# Patient Record
Sex: MALE | Race: WHITE | NOT HISPANIC OR LATINO | Employment: FULL TIME | URBAN - NONMETROPOLITAN AREA
[De-identification: names, ages, dates, MRNs, and addresses within clinical notes are randomized per-mention and may not be internally consistent; named-entity substitution may affect disease eponyms.]

---

## 2017-12-20 ENCOUNTER — HOSPITAL ENCOUNTER (OUTPATIENT)
Facility: HOSPITAL | Age: 47
Setting detail: OBSERVATION
LOS: 1 days | Discharge: HOME | End: 2017-12-21
Attending: SURGERY | Admitting: SURGERY

## 2017-12-20 ENCOUNTER — OFFICE VISIT (OUTPATIENT)
Dept: SURGERY | Facility: CLINIC | Age: 47
End: 2017-12-20

## 2017-12-20 ENCOUNTER — APPOINTMENT (OUTPATIENT)
Dept: ULTRASOUND IMAGING | Facility: HOSPITAL | Age: 47
End: 2017-12-20

## 2017-12-20 VITALS
TEMPERATURE: 97.7 F | WEIGHT: 204 LBS | BODY MASS INDEX: 29.2 KG/M2 | HEIGHT: 70 IN | HEART RATE: 97 BPM | DIASTOLIC BLOOD PRESSURE: 93 MMHG | SYSTOLIC BLOOD PRESSURE: 125 MMHG

## 2017-12-20 DIAGNOSIS — N50.811 TESTICULAR PAIN, RIGHT: Primary | ICD-10-CM

## 2017-12-20 DIAGNOSIS — N45.2 ORCHITIS OF RIGHT TESTICLE: Primary | ICD-10-CM

## 2017-12-20 LAB
ALBUMIN SERPL-MCNC: 4 G/DL (ref 3.5–5)
ALBUMIN/GLOB SERPL: 1.7 G/DL (ref 1.5–2.5)
ALP SERPL-CCNC: 152 U/L (ref 40–129)
ALT SERPL W P-5'-P-CCNC: 41 U/L (ref 10–44)
ANION GAP SERPL CALCULATED.3IONS-SCNC: 6.1 MMOL/L (ref 3.6–11.2)
AST SERPL-CCNC: 19 U/L (ref 10–34)
BASOPHILS # BLD AUTO: 0.01 10*3/MM3 (ref 0–0.3)
BASOPHILS NFR BLD AUTO: 0.1 % (ref 0–2)
BILIRUB SERPL-MCNC: 0.8 MG/DL (ref 0.2–1.8)
BUN BLD-MCNC: 16 MG/DL (ref 7–21)
BUN/CREAT SERPL: 16.8 (ref 7–25)
CALCIUM SPEC-SCNC: 9 MG/DL (ref 7.7–10)
CHLORIDE SERPL-SCNC: 99 MMOL/L (ref 99–112)
CO2 SERPL-SCNC: 31.9 MMOL/L (ref 24.3–31.9)
CREAT BLD-MCNC: 0.95 MG/DL (ref 0.43–1.29)
DEPRECATED RDW RBC AUTO: 42.7 FL (ref 37–54)
EOSINOPHIL # BLD AUTO: 0 10*3/MM3 (ref 0–0.7)
EOSINOPHIL NFR BLD AUTO: 0 % (ref 0–5)
ERYTHROCYTE [DISTWIDTH] IN BLOOD BY AUTOMATED COUNT: 13.4 % (ref 11.5–14.5)
GFR SERPL CREATININE-BSD FRML MDRD: 85 ML/MIN/1.73
GLOBULIN UR ELPH-MCNC: 2.4 GM/DL
GLUCOSE BLD-MCNC: 107 MG/DL (ref 70–110)
HCT VFR BLD AUTO: 37.9 % (ref 42–52)
HGB BLD-MCNC: 12.3 G/DL (ref 14–18)
IMM GRANULOCYTES # BLD: 0.05 10*3/MM3 (ref 0–0.03)
IMM GRANULOCYTES NFR BLD: 0.3 % (ref 0–0.5)
LYMPHOCYTES # BLD AUTO: 0.91 10*3/MM3 (ref 1–3)
LYMPHOCYTES NFR BLD AUTO: 6.4 % (ref 21–51)
MCH RBC QN AUTO: 28.7 PG (ref 27–33)
MCHC RBC AUTO-ENTMCNC: 32.5 G/DL (ref 33–37)
MCV RBC AUTO: 88.3 FL (ref 80–94)
MONOCYTES # BLD AUTO: 0.8 10*3/MM3 (ref 0.1–0.9)
MONOCYTES NFR BLD AUTO: 5.6 % (ref 0–10)
NEUTROPHILS # BLD AUTO: 12.55 10*3/MM3 (ref 1.4–6.5)
NEUTROPHILS NFR BLD AUTO: 87.6 % (ref 30–70)
OSMOLALITY SERPL CALC.SUM OF ELEC: 275.5 MOSM/KG (ref 273–305)
PLATELET # BLD AUTO: 289 10*3/MM3 (ref 130–400)
PMV BLD AUTO: 10 FL (ref 6–10)
POTASSIUM BLD-SCNC: 3.6 MMOL/L (ref 3.5–5.3)
PROT SERPL-MCNC: 6.4 G/DL (ref 6–8)
RBC # BLD AUTO: 4.29 10*6/MM3 (ref 4.7–6.1)
SODIUM BLD-SCNC: 137 MMOL/L (ref 135–153)
WBC NRBC COR # BLD: 14.32 10*3/MM3 (ref 4.5–12.5)

## 2017-12-20 PROCEDURE — 25010000002 ENOXAPARIN PER 10 MG: Performed by: SURGERY

## 2017-12-20 PROCEDURE — 94799 UNLISTED PULMONARY SVC/PX: CPT

## 2017-12-20 PROCEDURE — 25010000002 MORPHINE PER 10 MG: Performed by: SURGERY

## 2017-12-20 PROCEDURE — 85025 COMPLETE CBC W/AUTO DIFF WBC: CPT | Performed by: SURGERY

## 2017-12-20 PROCEDURE — 76870 US EXAM SCROTUM: CPT | Performed by: RADIOLOGY

## 2017-12-20 PROCEDURE — 96372 THER/PROPH/DIAG INJ SC/IM: CPT

## 2017-12-20 PROCEDURE — 96374 THER/PROPH/DIAG INJ IV PUSH: CPT

## 2017-12-20 PROCEDURE — 99220 PR INITIAL OBSERVATION CARE/DAY 70 MINUTES: CPT | Performed by: SURGERY

## 2017-12-20 PROCEDURE — 76870 US EXAM SCROTUM: CPT

## 2017-12-20 PROCEDURE — 25010000002 LEVOFLOXACIN PER 250 MG: Performed by: SURGERY

## 2017-12-20 PROCEDURE — 80053 COMPREHEN METABOLIC PANEL: CPT | Performed by: SURGERY

## 2017-12-20 PROCEDURE — G0378 HOSPITAL OBSERVATION PER HR: HCPCS

## 2017-12-20 PROCEDURE — S0260 H&P FOR SURGERY: HCPCS | Performed by: SURGERY

## 2017-12-20 RX ORDER — ONDANSETRON 2 MG/ML
4 INJECTION INTRAMUSCULAR; INTRAVENOUS EVERY 6 HOURS PRN
Status: DISCONTINUED | OUTPATIENT
Start: 2017-12-20 | End: 2017-12-21 | Stop reason: HOSPADM

## 2017-12-20 RX ORDER — LEVOFLOXACIN 5 MG/ML
500 INJECTION, SOLUTION INTRAVENOUS EVERY 24 HOURS
Status: DISCONTINUED | OUTPATIENT
Start: 2017-12-20 | End: 2017-12-21 | Stop reason: HOSPADM

## 2017-12-20 RX ORDER — MORPHINE SULFATE 2 MG/ML
1 INJECTION, SOLUTION INTRAMUSCULAR; INTRAVENOUS EVERY 4 HOURS PRN
Status: DISCONTINUED | OUTPATIENT
Start: 2017-12-20 | End: 2017-12-21 | Stop reason: HOSPADM

## 2017-12-20 RX ORDER — ONDANSETRON 4 MG/1
4 TABLET, ORALLY DISINTEGRATING ORAL EVERY 6 HOURS PRN
Status: DISCONTINUED | OUTPATIENT
Start: 2017-12-20 | End: 2017-12-21 | Stop reason: HOSPADM

## 2017-12-20 RX ORDER — ONDANSETRON 4 MG/1
4 TABLET, FILM COATED ORAL EVERY 6 HOURS PRN
Status: DISCONTINUED | OUTPATIENT
Start: 2017-12-20 | End: 2017-12-21 | Stop reason: HOSPADM

## 2017-12-20 RX ORDER — SODIUM CHLORIDE 450 MG/100ML
50 INJECTION, SOLUTION INTRAVENOUS CONTINUOUS
Status: DISCONTINUED | OUTPATIENT
Start: 2017-12-20 | End: 2017-12-21 | Stop reason: HOSPADM

## 2017-12-20 RX ORDER — SODIUM CHLORIDE 0.9 % (FLUSH) 0.9 %
1-10 SYRINGE (ML) INJECTION AS NEEDED
Status: DISCONTINUED | OUTPATIENT
Start: 2017-12-20 | End: 2017-12-21 | Stop reason: HOSPADM

## 2017-12-20 RX ORDER — NALOXONE HCL 0.4 MG/ML
0.4 VIAL (ML) INJECTION
Status: DISCONTINUED | OUTPATIENT
Start: 2017-12-20 | End: 2017-12-21 | Stop reason: HOSPADM

## 2017-12-20 RX ADMIN — MORPHINE SULFATE 1 MG: 2 INJECTION, SOLUTION INTRAMUSCULAR; INTRAVENOUS at 20:32

## 2017-12-20 RX ADMIN — ENOXAPARIN SODIUM 40 MG: 40 INJECTION SUBCUTANEOUS at 17:47

## 2017-12-20 RX ADMIN — SODIUM CHLORIDE 50 ML/HR: 4.5 INJECTION, SOLUTION INTRAVENOUS at 17:47

## 2017-12-20 RX ADMIN — LEVOFLOXACIN 500 MG: 5 INJECTION, SOLUTION INTRAVENOUS at 17:48

## 2017-12-20 NOTE — PROGRESS NOTES
"12/20/2017    Patient Information  Brad Botello   [address]  [place] [address]  1970  [phone] (home)     Chief Complaint   Patient presents with   • Hernia     Dr. Smith Referral       HPI  Patient is a 47 or white male referred by Dr. Smith office.  He is complaining of severe right testicular pain.  He says is radiating up into his inguinal area on the right side.  He said this started a week ago but in the last 2 days is gotten much worse.  It is bothering him so much he can hardly move.    Review of Systems:  The Review of Systems has been reviewed and confirmed.    General: fever  Integumentary: negative  Eyes: negative  ENT: negative  Respiratory: negative  Gastrointestinal: nausea/vomiting, loss of appetite, heartburn and abdominal pain  Cardiovascular: negative  Neurological: negative  Psychiatric: negative  Hematologic/Lymphatic: negative  Genitourinary: negative  Musculoskeletal: painful joints, joint swelling and joint stiffness  Endocrine: negative  Breasts: negative      There is no problem list on file for this patient.        Past Medical History:   Diagnosis Date   • Arthritis          Past Surgical History:   Procedure Laterality Date   • KNEE SURGERY Bilateral          Family History   Problem Relation Age of Onset   • Hypertension Mother    • Heart disease Mother    • Diabetes Mother    • Cancer Mother          Social History   Substance Use Topics   • Smoking status: Current Every Day Smoker     Types: Electronic Cigarette   • Smokeless tobacco: Never Used      Comment: Vapor   • Alcohol use No       No current outpatient prescriptions on file.     No current facility-administered medications for this visit.          Allergies  Review of patient's allergies indicates no known allergies.    /93  Pulse 97  Temp 97.7 °F (36.5 °C)  Ht 177.8 cm (70\")  Wt 92.5 kg (204 lb)  BMI 29.27 kg/m2     Physical Exam   Constitutional: He is oriented to person, place, and time. He appears " well-developed and well-nourished. No distress.   HENT:   Head: Normocephalic.   Right Ear: External ear normal.   Left Ear: External ear normal.   Nose: Nose normal.   Mouth/Throat: Oropharynx is clear and moist.   Eyes: Conjunctivae and EOM are normal. Right eye exhibits no discharge. Left eye exhibits no discharge.   Neck: Normal range of motion. No JVD present. No tracheal deviation present. No thyromegaly present.   Cardiovascular: Normal rate, regular rhythm, normal heart sounds and intact distal pulses.  Exam reveals no gallop and no friction rub.    No murmur heard.  Pulmonary/Chest: Effort normal and breath sounds normal. No stridor. No respiratory distress. He has no wheezes. He has no rales. He exhibits no tenderness.   Abdominal: Soft. Bowel sounds are normal. He exhibits no distension and no mass. There is no tenderness. There is no rebound and no guarding. No hernia.   Genitourinary: Rectal exam shows guaiac negative stool.   Genitourinary Comments: Right testicle red and swollen and extremely tender.  Mild tenderness in right groin area.  Left testicle is nontender   Musculoskeletal: Normal range of motion. He exhibits no edema, tenderness or deformity.   Lymphadenopathy:     He has no cervical adenopathy.   Neurological: He is alert and oriented to person, place, and time. He has normal reflexes. He displays normal reflexes. No cranial nerve deficit. He exhibits normal muscle tone. Coordination normal.   Skin: Skin is warm and dry. No rash noted. He is not diaphoretic. No erythema. No pallor.   Psychiatric: He has a normal mood and affect. His behavior is normal. Judgment and thought content normal.   Nursing note and vitals reviewed.                ASSESSMENT  Severe right testicular pain rule out testicular torsion, rule out orchitis or epididymitis        PLAN    Will admit to the hospital.  We'll get a stat ultrasound of right testicle.  We'll also start him on antibiotics.  Depending on findings  may need to get urologist involved.        Ehsan Ibrahim MD

## 2017-12-20 NOTE — PLAN OF CARE
Problem: Patient Care Overview (Adult)  Goal: Plan of Care Review  Outcome: Ongoing (interventions implemented as appropriate)   12/20/17 1610   Coping/Psychosocial Response Interventions   Plan Of Care Reviewed With patient;spouse   Patient Care Overview   Progress no change     Goal: Adult Individualization and Mutuality  Outcome: Ongoing (interventions implemented as appropriate)    Goal: Discharge Needs Assessment  Outcome: Ongoing (interventions implemented as appropriate)      Problem: Skin Integrity Impairment, Risk/Actual (Adult)  Goal: Identify Related Risk Factors and Signs and Symptoms  Outcome: Ongoing (interventions implemented as appropriate)   12/20/17 1610   Skin Integrity Impairment, Risk/Actual   Skin Integrity Impairment, Risk/Actual: Related Risk Factors skin disorders   Signs and Symptoms (Skin Integrity Impairment) edema;inflammation     Goal: Skin Integrity/Wound Healing  Outcome: Ongoing (interventions implemented as appropriate)   12/20/17 1610   Skin Integrity Impairment, Risk/Actual (Adult)   Skin Integrity/Wound Healing making progress toward outcome       Problem: Pain, Acute (Adult)  Goal: Identify Related Risk Factors and Signs and Symptoms  Outcome: Ongoing (interventions implemented as appropriate)   12/20/17 1610   Pain, Acute   Related Risk Factors (Acute Pain) disease process   Signs and Symptoms (Acute Pain) verbalization of pain descriptors     Goal: Acceptable Pain Control/Comfort Level  Outcome: Ongoing (interventions implemented as appropriate)   12/20/17 1610   Pain, Acute (Adult)   Acceptable Pain Control/Comfort Level making progress toward outcome

## 2017-12-21 VITALS
WEIGHT: 198 LBS | HEART RATE: 93 BPM | TEMPERATURE: 97.9 F | HEIGHT: 70 IN | OXYGEN SATURATION: 96 % | RESPIRATION RATE: 18 BRPM | SYSTOLIC BLOOD PRESSURE: 133 MMHG | BODY MASS INDEX: 28.35 KG/M2 | DIASTOLIC BLOOD PRESSURE: 78 MMHG

## 2017-12-21 PROCEDURE — 25010000002 MORPHINE PER 10 MG: Performed by: SURGERY

## 2017-12-21 PROCEDURE — 93010 ELECTROCARDIOGRAM REPORT: CPT | Performed by: INTERNAL MEDICINE

## 2017-12-21 PROCEDURE — G0378 HOSPITAL OBSERVATION PER HR: HCPCS

## 2017-12-21 PROCEDURE — 99225 PR SBSQ OBSERVATION CARE/DAY 25 MINUTES: CPT | Performed by: SURGERY

## 2017-12-21 PROCEDURE — 96375 TX/PRO/DX INJ NEW DRUG ADDON: CPT

## 2017-12-21 PROCEDURE — 99243 OFF/OP CNSLTJ NEW/EST LOW 30: CPT | Performed by: UROLOGY

## 2017-12-21 PROCEDURE — 93005 ELECTROCARDIOGRAM TRACING: CPT | Performed by: SURGERY

## 2017-12-21 RX ORDER — LEVOFLOXACIN 500 MG/1
500 TABLET, FILM COATED ORAL DAILY
Qty: 21 TABLET | Refills: 1 | Status: SHIPPED | OUTPATIENT
Start: 2017-12-21 | End: 2019-08-18

## 2017-12-21 RX ORDER — OXYCODONE HYDROCHLORIDE AND ACETAMINOPHEN 5; 325 MG/1; MG/1
TABLET ORAL
Qty: 40 TABLET | Refills: 0 | Status: SHIPPED | OUTPATIENT
Start: 2017-12-21 | End: 2019-08-18

## 2017-12-21 RX ADMIN — MORPHINE SULFATE 1 MG: 2 INJECTION, SOLUTION INTRAMUSCULAR; INTRAVENOUS at 00:29

## 2017-12-21 NOTE — H&P
"Brad Botello   [address]  [facility] [address]  1970  [phone] (home)           Chief Complaint   Patient presents with   • Hernia       Dr. Smith Referral         HPI  Patient is a 47 or white male referred by Dr. Smith office.  He is complaining of severe right testicular pain.  He says is radiating up into his inguinal area on the right side.  He said this started a week ago but in the last 2 days is gotten much worse.  It is bothering him so much he can hardly move.     Review of Systems:  The Review of Systems has been reviewed and confirmed.     General: fever  Integumentary: negative  Eyes: negative  ENT: negative  Respiratory: negative  Gastrointestinal: nausea/vomiting, loss of appetite, heartburn and abdominal pain  Cardiovascular: negative  Neurological: negative  Psychiatric: negative  Hematologic/Lymphatic: negative  Genitourinary: negative  Musculoskeletal: painful joints, joint swelling and joint stiffness  Endocrine: negative  Breasts: negative        There is no problem list on file for this patient.            Medical History         Past Medical History:   Diagnosis Date   • Arthritis                  Surgical History          Past Surgical History:   Procedure Laterality Date   • KNEE SURGERY Bilateral                       Family History   Problem Relation Age of Onset   • Hypertension Mother     • Heart disease Mother     • Diabetes Mother     • Cancer Mother                     Social History   Substance Use Topics   • Smoking status: Current Every Day Smoker       Types: Electronic Cigarette   • Smokeless tobacco: Never Used         Comment: Vapor   • Alcohol use No          Current Medications    No current outpatient prescriptions on file.      No current facility-administered medications for this visit.                Allergies  Review of patient's allergies indicates no known allergies.     /93  Pulse 97  Temp 97.7 °F (36.5 °C)  Ht 177.8 cm (70\")  Wt 92.5 kg (204 lb)  BMI " 29.27 kg/m2      Physical Exam   Constitutional: He is oriented to person, place, and time. He appears well-developed and well-nourished. No distress.   HENT:   Head: Normocephalic.   Right Ear: External ear normal.   Left Ear: External ear normal.   Nose: Nose normal.   Mouth/Throat: Oropharynx is clear and moist.   Eyes: Conjunctivae and EOM are normal. Right eye exhibits no discharge. Left eye exhibits no discharge.   Neck: Normal range of motion. No JVD present. No tracheal deviation present. No thyromegaly present.   Cardiovascular: Normal rate, regular rhythm, normal heart sounds and intact distal pulses.  Exam reveals no gallop and no friction rub.    No murmur heard.  Pulmonary/Chest: Effort normal and breath sounds normal. No stridor. No respiratory distress. He has no wheezes. He has no rales. He exhibits no tenderness.   Abdominal: Soft. Bowel sounds are normal. He exhibits no distension and no mass. There is no tenderness. There is no rebound and no guarding. No hernia.   Genitourinary: Rectal exam shows guaiac negative stool.   Genitourinary Comments: Right testicle red and swollen and extremely tender.  Mild tenderness in right groin area.  Left testicle is nontender   Musculoskeletal: Normal range of motion. He exhibits no edema, tenderness or deformity.   Lymphadenopathy:     He has no cervical adenopathy.   Neurological: He is alert and oriented to person, place, and time. He has normal reflexes. He displays normal reflexes. No cranial nerve deficit. He exhibits normal muscle tone. Coordination normal.   Skin: Skin is warm and dry. No rash noted. He is not diaphoretic. No erythema. No pallor.   Psychiatric: He has a normal mood and affect. His behavior is normal. Judgment and thought content normal.   Nursing note and vitals reviewed.                    Assessment/Plan      ASSESSMENT  Severe right testicular pain rule out testicular torsion, rule out orchitis or epididymitis           PLAN     Will  admit to the hospital.  We'll get a stat ultrasound of right testicle.  We'll also start him on antibiotics.  Depending on findings may need to get urologist involved.           Ehsan Ibrahim MD

## 2017-12-21 NOTE — DISCHARGE SUMMARY
Patient Care Team:  Adama Smith MD as PCP - General (Family Medicine)    Chief complaint right testicular pain     Subjective     Patient is a 47 y.o. male presents with progressive right testicular pain that after a week was so bad he could not walk.  Pt's ultrasound was consistent with epididymorchitis.     Review of Systems   Pertinent items are noted in HPI, all other systems reviewed and negative    History  Past Medical History:   Diagnosis Date   • Arthritis      Past Surgical History:   Procedure Laterality Date   • KNEE SURGERY Bilateral      Family History   Problem Relation Age of Onset   • Hypertension Mother    • Heart disease Mother    • Diabetes Mother    • Cancer Mother      Social History   Substance Use Topics   • Smoking status: Current Every Day Smoker     Types: Electronic Cigarette   • Smokeless tobacco: Never Used      Comment: Vapor   • Alcohol use No     No prescriptions prior to admission.     Allergies:  Review of patient's allergies indicates no known allergies.    Objective     Vital Signs  Temp:  [97.7 °F (36.5 °C)-98.8 °F (37.1 °C)] 98.8 °F (37.1 °C)  Heart Rate:  [78-97] 78  Resp:  [18-20] 18  BP: (116-127)/(74-93) 127/74    Physical Exam:    Pt has a right scrotal phlegmon with inflamation and tenderness    Results Review:    I reviewed the patient's new clinical results.    Assessment/Plan     Active Problems:    Orchitis of right testicle    Will send pt home with leviguin and percocet and ice with elevation.  Will see him in 3 weeks.    I discussed the patients findings and my recommendations with patient and nursing staff.     Gilberto Pink MD  12/21/17  8:59 AM

## 2017-12-21 NOTE — CONSULTS
Patient Care Team:  Adama Smith MD as PCP - General (Family Medicine)    Chief complaint right testicular pain    Subjective     Patient is a 47 y.o. male presents with right testicular pain that is so severe pt could not walk.  The pain started slowly about a week ago and has progressed to being intolerable.  Pt has no hx of trauma.  Pt had a ultrasound that was consistent with right epididymorchitis. Pt has hx of low grade fevers.  Review of Systems   Pertinent items are noted in HPI, all other systems reviewed and negative    History  Past Medical History:   Diagnosis Date   • Arthritis      Past Surgical History:   Procedure Laterality Date   • KNEE SURGERY Bilateral      Family History   Problem Relation Age of Onset   • Hypertension Mother    • Heart disease Mother    • Diabetes Mother    • Cancer Mother      Social History   Substance Use Topics   • Smoking status: Current Every Day Smoker     Types: Electronic Cigarette   • Smokeless tobacco: Never Used      Comment: Vapor   • Alcohol use No     No prescriptions prior to admission.     Allergies:  Review of patient's allergies indicates no known allergies.    Objective     Vital Signs  Temp:  [97.7 °F (36.5 °C)-98.8 °F (37.1 °C)] 98.8 °F (37.1 °C)  Heart Rate:  [78-97] 78  Resp:  [18-20] 18  BP: (116-127)/(74-93) 127/74    Physical Exam:    General Appearance alert, appears stated age and cooperative  Head normocephalic, without obvious abnormality and atraumatic  Eyes lids and lashes normal, conjunctivae and sclerae normal, no icterus, no pallor, corneas clear and PERRLA  Neck no adenopathy, suppple, trachea midline, no thyromegaly, no carotid bruit and no JVD  Lungs clear to auscultation, respirations regular, respirations even and respirations unlabored  Heart regular rhythm & normal rate, normal S1, S2, no murmur, no leonardo, no rub and no click  Abdomen normal bowel sounds, no masses, no hepatomegaly, no splenomegaly, soft non-tender, no  guarding and no rebound tenderness  Male Genitalia large tender inflamed right scrotal phlegmon  Rectal Deferred  Extremities moves extremities well, no edema, no cyanosis and no redness  Skin no bleeding, bruising or rash  Neurologic Mental Status orientated to person, place, time and situation    Results Review:    I reviewed the patient's new clinical results.    Assessment/Plan     Active Problems:    Orchitis of right testicle  I think pt has improved on levaquin and he clearly has epididymorchitis.  I would treat him with levaquin and percocet and ice packs.  I told him it would be 3 to 4 weeks to resolve it.  If he has severe pain again we could consider more aggressive therapy such as a orciectomy but I would not recommend it unless he is misserable.      I discussed the patients findings and my recommendations with patient and nursing staff.     Gilebrto Pink MD  12/21/17  8:49 AM

## 2017-12-21 NOTE — PLAN OF CARE
Problem: Patient Care Overview (Adult)  Goal: Plan of Care Review  Outcome: Ongoing (interventions implemented as appropriate)   12/21/17 0451   Coping/Psychosocial Response Interventions   Plan Of Care Reviewed With patient   Patient Care Overview   Progress no change       Problem: Pain, Acute (Adult)  Goal: Identify Related Risk Factors and Signs and Symptoms  Outcome: Ongoing (interventions implemented as appropriate)

## 2017-12-21 NOTE — PROGRESS NOTES
Hospital day #1, orchitis     Subjective:  Patient says he feels much better.  Still has some right testicular tenderness but tremendously improved over yesterday.  He has been receiving antibiotics and appears to have responded nicely.  Ultrasound shows evidence of epididymitis and orchitis.  Dr. Pink, urology, seen the patient and has are discharged him on antibiotics and is being seen in his office in a week.    Objective     Vital Signs  Temp:  [97.7 °F (36.5 °C)-98.8 °F (37.1 °C)] 98.8 °F (37.1 °C)  Heart Rate:  [78-97] 78  Resp:  [18-20] 18  BP: (116-127)/(74-93) 127/74    Physical Exam:     General Appearance:    Alert, cooperative, in no acute distress   Head:    Normocephalic, without obvious abnormality, atraumatic   Eyes:            Lids and lashes normal, conjunctivae and sclerae normal, no   icterus, no pallor, corneas clear, PERRLA   Ears:    Ears appear intact with no abnormalities noted   Throat:   No oral lesions, no thrush, oral mucosa moist   Neck:   No adenopathy, supple, trachea midline, no thyromegaly, no   carotid bruit, no JVD   Back:     No kyphosis present, no scoliosis present, no skin lesions,      erythema or scars, no tenderness to percussion or                   palpation,   range of motion normal   Lungs:     Clear to auscultation,respirations regular, even and                  unlabored    Heart:    Regular rhythm and normal rate, normal S1 and S2, no            murmur, no gallop, no rub, no click   Chest Wall:    No abnormalities observed   Abdomen:    soft, nontender   Rectal:   Right testicle much less tender and swollen.   Extremities:   Moves all extremities well, no edema, no cyanosis, no             redness   Pulses:   Pulses palpable and equal bilaterally   Skin:   No bleeding, bruising or rash   Lymph nodes:   No palpable adenopathy   Neurologic:   Cranial nerves 2 - 12 grossly intact, sensation intact, DTR       present and equal bilaterally        Results Review:    Lab  Results (last 24 hours)     Procedure Component Value Units Date/Time    CBC Auto Differential [[id number]]  (Abnormal) Collected:  12/20/17 1639    Specimen:  Blood Updated:  12/20/17 1655     WBC 14.32 (H) 10*3/mm3      RBC 4.29 (L) 10*6/mm3      Hemoglobin 12.3 (L) g/dL      Hematocrit 37.9 (L) %      MCV 88.3 fL      MCH 28.7 pg      MCHC 32.5 (L) g/dL      RDW 13.4 %      RDW-SD 42.7 fl      MPV 10.0 fL      Platelets 289 10*3/mm3      Neutrophil % 87.6 (H) %      Lymphocyte % 6.4 (L) %      Monocyte % 5.6 %      Eosinophil % 0.0 %      Basophil % 0.1 %      Immature Grans % 0.3 %      Neutrophils, Absolute 12.55 (H) 10*3/mm3      Lymphocytes, Absolute 0.91 (L) 10*3/mm3      Monocytes, Absolute 0.80 10*3/mm3      Eosinophils, Absolute 0.00 10*3/mm3      Basophils, Absolute 0.01 10*3/mm3      Immature Grans, Absolute 0.05 (H) 10*3/mm3     Osmolality, Calculated [[id number]]  (Normal) Collected:  12/20/17 1639    Specimen:  Blood Updated:  12/20/17 1711     Osmolality Calc 275.5 mOsm/kg     Comprehensive Metabolic Panel [[id number]]  (Abnormal) Collected:  12/20/17 1639    Specimen:  Blood Updated:  12/20/17 1711     Glucose 107 mg/dL      BUN 16 mg/dL      Creatinine 0.95 mg/dL      Sodium 137 mmol/L      Potassium 3.6 mmol/L      Chloride 99 mmol/L      CO2 31.9 mmol/L      Calcium 9.0 mg/dL      Total Protein 6.4 g/dL      Albumin 4.00 g/dL      ALT (SGPT) 41 U/L      AST (SGOT) 19 U/L      Alkaline Phosphatase 152 (H) U/L       Note New Reference Ranges        Total Bilirubin 0.8 mg/dL      eGFR Non African Amer 85 mL/min/1.73      Globulin 2.4 gm/dL      A/G Ratio 1.7 g/dL      BUN/Creatinine Ratio 16.8     Anion Gap 6.1 mmol/L           Imaging Results (last 72 hours)     Procedure Component Value Units Date/Time    US Scrotum & Testicles [[id number]] Collected:  12/21/17 0913     Updated:  12/21/17 0917    Narrative:       EXAMINATION: US SCROTUM AND TESTICLES-      CLINICAL INDICATION:     Swollen tender  right testicle     TECHNIQUE: Multiplanar gray scale and Doppler vascular sonographic  imaging of scrotum.      COMPARISON: NONE      FINDINGS:     Right testicle is 2.9 x 4.5 x 3.2 cm. Moderate size hydrocele is noted  with septation. Hypervascularity of the epididymal head and of the right  testicle can be seen with epididymoorchitis. No evidence of torsion.     Left testicle is 2.6 x 4.1 x 2.2 cm. No parenchymal mass. No evidence of  distortion.     Scrotal soft tissue thickening mainly along the right side.       Impression:       1. Increased vascularity with some asymmetry of the right testicle and  epididymal head most consistent with epididymoorchitis.  2. No evidence of testicular torsion.  3. Moderate size complex right hydrocele secondary to inflammatory  changes.     This report was finalized on 12/21/2017 9:15 AM by Dr. David Rubio MD.             Assessment:  Orchitis, responding to antibiotics   Plan:  Discharge home on oral antibiotics and follow up with Dr. Pink, urologist.    Ehsan Ibrahim MD  12/21/17  9:57 AM    Dragon disclaimer:  Much of this encounter note is an electronic transcription/translation of spoken language to printed text. The electronic translation of spoken language may permit erroneous, or at times, nonsensical words or phrases to be inadvertently transcribed; Although I have reviewed the note for such errors, some may still exist.

## 2018-01-02 ENCOUNTER — OFFICE VISIT (OUTPATIENT)
Dept: SURGERY | Facility: CLINIC | Age: 48
End: 2018-01-02

## 2018-01-02 VITALS
DIASTOLIC BLOOD PRESSURE: 77 MMHG | HEART RATE: 85 BPM | SYSTOLIC BLOOD PRESSURE: 128 MMHG | WEIGHT: 197.97 LBS | BODY MASS INDEX: 28.34 KG/M2 | HEIGHT: 70 IN | TEMPERATURE: 98 F

## 2018-01-02 DIAGNOSIS — N50.811 TESTICULAR PAIN, RIGHT: ICD-10-CM

## 2018-01-02 DIAGNOSIS — N45.2 ORCHITIS OF RIGHT TESTICLE: Primary | ICD-10-CM

## 2018-01-02 PROCEDURE — 99213 OFFICE O/P EST LOW 20 MIN: CPT | Performed by: SURGERY

## 2018-01-02 NOTE — PROGRESS NOTES
1/2/2018    Patient Information  Brad Botello   [address]  [place] [address]  1970  [phone] (home)     Chief Complaint   Patient presents with   • Follow-up     R Testicular Pain       HPI  Patient is a 47-year-old white male here for follow-up on his orchitis.  He had to be admitted to the hospital 2 weeks ago because of severe orchitis.  He is followed by a urologist.  He reports she's tremendously improved    Review of Systems:  The Review of Systems has been reviewed and confirmed.    General: fever  Integumentary: negative  Eyes: negative  ENT: negative  Respiratory: negative  Gastrointestinal: nausea/vomiting, loss of appetite, heartburn and abdominal pain  Cardiovascular: negative  Neurological: negative  Psychiatric: negative  Hematologic/Lymphatic: negative  Genitourinary: negative  Musculoskeletal: painful joints, joint swelling and joint stiffness  Endocrine: negative  Breasts: negative    Patient Active Problem List   Diagnosis   • Orchitis of right testicle         Past Medical History:   Diagnosis Date   • Arthritis          Past Surgical History:   Procedure Laterality Date   • KNEE SURGERY Bilateral          Family History   Problem Relation Age of Onset   • Hypertension Mother    • Heart disease Mother    • Diabetes Mother    • Cancer Mother          Social History   Substance Use Topics   • Smoking status: Current Every Day Smoker     Types: Electronic Cigarette   • Smokeless tobacco: Never Used      Comment: Vapor   • Alcohol use No       Current Outpatient Prescriptions   Medication Sig Dispense Refill   • levoFLOXacin (LEVAQUIN) 500 MG tablet Take 1 tablet by mouth Daily for 21 days. 21 tablet 1   • oxyCODONE-acetaminophen (PERCOCET) 5-325 MG per tablet Take 1 to 2 Tablets by mouth Every 6 Hours as needed for PAIN 40 tablet 0     No current facility-administered medications for this visit.          Allergies  Review of patient's allergies indicates no known allergies.    /77   "Pulse 85  Temp 98 °F (36.7 °C)  Ht 177.8 cm (70\")  Wt 89.8 kg (197 lb 15.6 oz)  BMI 28.41 kg/m2     Physical Exam  Gen. 47-year-old white male stress  Right testicle nontender.  External genitalia normal            ASSESSMENT  Resolving right orchitis        PLAN    Continue follow-up with urologist and Dr. Smith.  Return here when necessary        Ehsan Ibrahim MD    "

## 2018-01-12 ENCOUNTER — OFFICE VISIT (OUTPATIENT)
Dept: UROLOGY | Facility: CLINIC | Age: 48
End: 2018-01-12

## 2018-01-12 DIAGNOSIS — N45.1 EPIDIDYMITIS, RIGHT: Primary | ICD-10-CM

## 2018-01-12 PROCEDURE — 99215 OFFICE O/P EST HI 40 MIN: CPT | Performed by: UROLOGY

## 2018-01-12 RX ORDER — SULFAMETHOXAZOLE AND TRIMETHOPRIM 800; 160 MG/1; MG/1
1 TABLET ORAL 2 TIMES DAILY
Qty: 60 TABLET | Refills: 0 | Status: SHIPPED | OUTPATIENT
Start: 2018-01-12 | End: 2019-08-18

## 2018-01-12 NOTE — PROGRESS NOTES
Chief Complaint:          Chief Complaint   Patient presents with   • Groin Swelling       HPI:   47 y.o. male.    HPI  Pt was admitted with epididymitis and treat with 3 weeks of levaquin.      Past Medical History:        Past Medical History:   Diagnosis Date   • Arthritis          Current Meds:     Current Outpatient Prescriptions   Medication Sig Dispense Refill   • oxyCODONE-acetaminophen (PERCOCET) 5-325 MG per tablet Take 1 to 2 Tablets by mouth Every 6 Hours as needed for PAIN 40 tablet 0   • levoFLOXacin (LEVAQUIN) 500 MG tablet Take 1 tablet by mouth Daily for 21 days. 21 tablet 1     No current facility-administered medications for this visit.         Allergies:      No Known Allergies     Past Surgical History:     Past Surgical History:   Procedure Laterality Date   • KNEE SURGERY Bilateral          Social History:     Social History     Social History   • Marital status:      Spouse name: N/A   • Number of children: N/A   • Years of education: N/A     Occupational History   • Not on file.     Social History Main Topics   • Smoking status: Current Every Day Smoker     Types: Electronic Cigarette   • Smokeless tobacco: Never Used      Comment: Vapor   • Alcohol use No   • Drug use: No   • Sexual activity: Defer     Other Topics Concern   • Not on file     Social History Narrative       Family History:     Family History   Problem Relation Age of Onset   • Hypertension Mother    • Heart disease Mother    • Diabetes Mother    • Cancer Mother        Review of Systems:     Review of Systems   Constitutional: Negative for chills, fatigue and fever.   Respiratory: Negative for cough, shortness of breath and wheezing.    Gastrointestinal: Negative for abdominal pain, nausea and vomiting.   Musculoskeletal: Negative for back pain and joint swelling.   Neurological: Negative for dizziness and headaches.   Psychiatric/Behavioral: Negative for confusion.       Physical Exam:     Physical Exam    Constitutional: He is oriented to person, place, and time.   HENT:   Head: Normocephalic and atraumatic.   Right Ear: External ear normal.   Left Ear: External ear normal.   Nose: Nose normal.   Mouth/Throat: Oropharynx is clear and moist.   Pt also complains of swelling of his parotid glands.  This has been going on for years.   Eyes: Conjunctivae and EOM are normal. Pupils are equal, round, and reactive to light.   Neck: Normal range of motion. Neck supple. No thyromegaly present.   Cardiovascular: Normal rate, regular rhythm, normal heart sounds and intact distal pulses.    No murmur heard.  Pulmonary/Chest: Effort normal and breath sounds normal. No respiratory distress. He has no wheezes. He has no rales. He exhibits no tenderness.   Abdominal: Soft. Bowel sounds are normal. He exhibits no distension and no mass. There is no tenderness. No hernia.   Genitourinary: Rectum normal, prostate normal and penis normal.   Genitourinary Comments: Pt has tender and hard epididymis.   Musculoskeletal: Normal range of motion. He exhibits no edema or tenderness.   Lymphadenopathy:     He has no cervical adenopathy.   Neurological: He is alert and oriented to person, place, and time. No cranial nerve deficit. He exhibits normal muscle tone. Coordination normal.   Skin: Skin is warm. No rash noted.   Psychiatric: He has a normal mood and affect. His behavior is normal. Judgment and thought content normal.   Nursing note and vitals reviewed.      Procedure:     No notes on file      Assessment:     Encounter Diagnosis   Name Primary?   • Epididymitis, right Yes     No orders of the defined types were placed in this encounter.      Plan:   I will rx septra for a month and will see him back in 6 weeks.    I would recommend he see a ENT specialist regarding parotid glands  Pt has penile adhesions that we could take down in the office.    Counseling was given to patient and family for the following topics instructions for  management. and the interim medical history and current results were reviewed.  A treatment plan with follow-up was made. Total time of the encounter was 45 minutes and 43 minutes were spent discussing Epididymitis, right [N45.1] face-to-face.       This document has been electronically signed by Gilberto Pink MD January 12, 2018 2:43 PM

## 2019-08-18 ENCOUNTER — APPOINTMENT (OUTPATIENT)
Dept: GENERAL RADIOLOGY | Facility: HOSPITAL | Age: 49
End: 2019-08-18

## 2019-08-18 ENCOUNTER — APPOINTMENT (OUTPATIENT)
Dept: CT IMAGING | Facility: HOSPITAL | Age: 49
End: 2019-08-18

## 2019-08-18 ENCOUNTER — HOSPITAL ENCOUNTER (EMERGENCY)
Facility: HOSPITAL | Age: 49
Discharge: HOME | End: 2019-08-18
Attending: EMERGENCY MEDICINE | Admitting: EMERGENCY MEDICINE

## 2019-08-18 VITALS
SYSTOLIC BLOOD PRESSURE: 130 MMHG | RESPIRATION RATE: 18 BRPM | HEART RATE: 94 BPM | WEIGHT: 192 LBS | OXYGEN SATURATION: 100 % | DIASTOLIC BLOOD PRESSURE: 82 MMHG | BODY MASS INDEX: 27.49 KG/M2 | TEMPERATURE: 98.2 F | HEIGHT: 70 IN

## 2019-08-18 DIAGNOSIS — R51.9 ACUTE NONINTRACTABLE HEADACHE, UNSPECIFIED HEADACHE TYPE: ICD-10-CM

## 2019-08-18 DIAGNOSIS — S30.0XXA LUMBAR CONTUSION, INITIAL ENCOUNTER: ICD-10-CM

## 2019-08-18 DIAGNOSIS — S09.90XA INJURY OF HEAD, INITIAL ENCOUNTER: Primary | ICD-10-CM

## 2019-08-18 LAB
ALBUMIN SERPL-MCNC: 4.38 G/DL (ref 3.5–5.2)
ALBUMIN/GLOB SERPL: 1.7 G/DL
ALP SERPL-CCNC: 70 U/L (ref 39–117)
ALT SERPL W P-5'-P-CCNC: 17 U/L (ref 1–41)
ANION GAP SERPL CALCULATED.3IONS-SCNC: 16.4 MMOL/L (ref 5–15)
AST SERPL-CCNC: 20 U/L (ref 1–40)
BASOPHILS # BLD AUTO: 0.02 10*3/MM3 (ref 0–0.2)
BASOPHILS NFR BLD AUTO: 0.3 % (ref 0–1.5)
BILIRUB SERPL-MCNC: 1 MG/DL (ref 0.2–1.2)
BUN BLD-MCNC: 17 MG/DL (ref 6–20)
BUN/CREAT SERPL: 14.8 (ref 7–25)
CALCIUM SPEC-SCNC: 9.2 MG/DL (ref 8.6–10.5)
CHLORIDE SERPL-SCNC: 102 MMOL/L (ref 98–107)
CO2 SERPL-SCNC: 22.6 MMOL/L (ref 22–29)
CREAT BLD-MCNC: 1.15 MG/DL (ref 0.76–1.27)
DEPRECATED RDW RBC AUTO: 44.1 FL (ref 37–54)
EOSINOPHIL # BLD AUTO: 0.07 10*3/MM3 (ref 0–0.4)
EOSINOPHIL NFR BLD AUTO: 1.2 % (ref 0.3–6.2)
ERYTHROCYTE [DISTWIDTH] IN BLOOD BY AUTOMATED COUNT: 13.8 % (ref 12.3–15.4)
ETHANOL BLD-MCNC: <10 MG/DL (ref 0–10)
ETHANOL UR QL: <0.01 %
GFR SERPL CREATININE-BSD FRML MDRD: 68 ML/MIN/1.73
GLOBULIN UR ELPH-MCNC: 2.6 GM/DL
GLUCOSE BLD-MCNC: 125 MG/DL (ref 65–99)
HCT VFR BLD AUTO: 42.9 % (ref 37.5–51)
HGB BLD-MCNC: 14.2 G/DL (ref 13–17.7)
IMM GRANULOCYTES # BLD AUTO: 0.01 10*3/MM3 (ref 0–0.05)
IMM GRANULOCYTES NFR BLD AUTO: 0.2 % (ref 0–0.5)
LYMPHOCYTES # BLD AUTO: 1.1 10*3/MM3 (ref 0.7–3.1)
LYMPHOCYTES NFR BLD AUTO: 19 % (ref 19.6–45.3)
MAGNESIUM SERPL-MCNC: 3.1 MG/DL (ref 1.6–2.6)
MCH RBC QN AUTO: 29.2 PG (ref 26.6–33)
MCHC RBC AUTO-ENTMCNC: 33.1 G/DL (ref 31.5–35.7)
MCV RBC AUTO: 88.1 FL (ref 79–97)
MONOCYTES # BLD AUTO: 0.65 10*3/MM3 (ref 0.1–0.9)
MONOCYTES NFR BLD AUTO: 11.2 % (ref 5–12)
NEUTROPHILS # BLD AUTO: 3.93 10*3/MM3 (ref 1.7–7)
NEUTROPHILS NFR BLD AUTO: 68.1 % (ref 42.7–76)
PLATELET # BLD AUTO: 233 10*3/MM3 (ref 140–450)
PMV BLD AUTO: 11 FL (ref 6–12)
POTASSIUM BLD-SCNC: 3.3 MMOL/L (ref 3.5–5.2)
PROT SERPL-MCNC: 7 G/DL (ref 6–8.5)
RBC # BLD AUTO: 4.87 10*6/MM3 (ref 4.14–5.8)
SODIUM BLD-SCNC: 141 MMOL/L (ref 136–145)
TROPONIN T SERPL-MCNC: <0.01 NG/ML (ref 0–0.03)
WBC NRBC COR # BLD: 5.78 10*3/MM3 (ref 3.4–10.8)

## 2019-08-18 PROCEDURE — 25010000002 KETOROLAC TROMETHAMINE PER 15 MG: Performed by: PHYSICIAN ASSISTANT

## 2019-08-18 PROCEDURE — 72170 X-RAY EXAM OF PELVIS: CPT

## 2019-08-18 PROCEDURE — 72131 CT LUMBAR SPINE W/O DYE: CPT

## 2019-08-18 PROCEDURE — 93010 ELECTROCARDIOGRAM REPORT: CPT | Performed by: INTERNAL MEDICINE

## 2019-08-18 PROCEDURE — 99285 EMERGENCY DEPT VISIT HI MDM: CPT

## 2019-08-18 PROCEDURE — 80053 COMPREHEN METABOLIC PANEL: CPT | Performed by: PHYSICIAN ASSISTANT

## 2019-08-18 PROCEDURE — 83735 ASSAY OF MAGNESIUM: CPT | Performed by: PHYSICIAN ASSISTANT

## 2019-08-18 PROCEDURE — 25010000002 MAGNESIUM SULFATE PER 500 MG OF MAGNESIUM: Performed by: PHYSICIAN ASSISTANT

## 2019-08-18 PROCEDURE — 96375 TX/PRO/DX INJ NEW DRUG ADDON: CPT

## 2019-08-18 PROCEDURE — 70450 CT HEAD/BRAIN W/O DYE: CPT

## 2019-08-18 PROCEDURE — 72125 CT NECK SPINE W/O DYE: CPT | Performed by: RADIOLOGY

## 2019-08-18 PROCEDURE — 72128 CT CHEST SPINE W/O DYE: CPT

## 2019-08-18 PROCEDURE — 80307 DRUG TEST PRSMV CHEM ANLYZR: CPT | Performed by: PHYSICIAN ASSISTANT

## 2019-08-18 PROCEDURE — 93005 ELECTROCARDIOGRAM TRACING: CPT | Performed by: PHYSICIAN ASSISTANT

## 2019-08-18 PROCEDURE — 85025 COMPLETE CBC W/AUTO DIFF WBC: CPT | Performed by: PHYSICIAN ASSISTANT

## 2019-08-18 PROCEDURE — 25010000002 PROCHLORPERAZINE 10 MG/2ML SOLUTION: Performed by: PHYSICIAN ASSISTANT

## 2019-08-18 PROCEDURE — 25010000002 THIAMINE PER 100 MG: Performed by: PHYSICIAN ASSISTANT

## 2019-08-18 PROCEDURE — 72131 CT LUMBAR SPINE W/O DYE: CPT | Performed by: RADIOLOGY

## 2019-08-18 PROCEDURE — 72125 CT NECK SPINE W/O DYE: CPT

## 2019-08-18 PROCEDURE — 72128 CT CHEST SPINE W/O DYE: CPT | Performed by: RADIOLOGY

## 2019-08-18 PROCEDURE — 70450 CT HEAD/BRAIN W/O DYE: CPT | Performed by: RADIOLOGY

## 2019-08-18 PROCEDURE — 84484 ASSAY OF TROPONIN QUANT: CPT | Performed by: PHYSICIAN ASSISTANT

## 2019-08-18 PROCEDURE — 96365 THER/PROPH/DIAG IV INF INIT: CPT

## 2019-08-18 RX ORDER — PROCHLORPERAZINE EDISYLATE 5 MG/ML
5 INJECTION INTRAMUSCULAR; INTRAVENOUS EVERY 6 HOURS PRN
Status: DISCONTINUED | OUTPATIENT
Start: 2019-08-18 | End: 2019-08-18 | Stop reason: HOSPADM

## 2019-08-18 RX ORDER — HYDROCODONE BITARTRATE AND ACETAMINOPHEN 5; 325 MG/1; MG/1
1 TABLET ORAL EVERY 6 HOURS PRN
Qty: 10 TABLET | Refills: 0 | Status: SHIPPED | OUTPATIENT
Start: 2019-08-18 | End: 2019-09-30

## 2019-08-18 RX ORDER — KETOROLAC TROMETHAMINE 10 MG/1
10 TABLET, FILM COATED ORAL EVERY 6 HOURS PRN
Qty: 8 TABLET | Refills: 0 | Status: SHIPPED | OUTPATIENT
Start: 2019-08-18 | End: 2019-08-18 | Stop reason: ALTCHOICE

## 2019-08-18 RX ORDER — KETOROLAC TROMETHAMINE 30 MG/ML
15 INJECTION, SOLUTION INTRAMUSCULAR; INTRAVENOUS ONCE
Status: COMPLETED | OUTPATIENT
Start: 2019-08-18 | End: 2019-08-18

## 2019-08-18 RX ORDER — SODIUM CHLORIDE 0.9 % (FLUSH) 0.9 %
10 SYRINGE (ML) INJECTION AS NEEDED
Status: DISCONTINUED | OUTPATIENT
Start: 2019-08-18 | End: 2019-08-18 | Stop reason: HOSPADM

## 2019-08-18 RX ORDER — POTASSIUM CHLORIDE 20 MEQ/1
20 TABLET, EXTENDED RELEASE ORAL ONCE
Status: COMPLETED | OUTPATIENT
Start: 2019-08-18 | End: 2019-08-18

## 2019-08-18 RX ORDER — ACETAMINOPHEN 325 MG/1
650 TABLET ORAL ONCE
Status: COMPLETED | OUTPATIENT
Start: 2019-08-18 | End: 2019-08-18

## 2019-08-18 RX ADMIN — PROCHLORPERAZINE EDISYLATE 5 MG: 5 INJECTION INTRAMUSCULAR; INTRAVENOUS at 18:45

## 2019-08-18 RX ADMIN — POTASSIUM CHLORIDE 20 MEQ: 1500 TABLET, EXTENDED RELEASE ORAL at 17:42

## 2019-08-18 RX ADMIN — KETOROLAC TROMETHAMINE 15 MG: 30 INJECTION, SOLUTION INTRAMUSCULAR at 18:44

## 2019-08-18 RX ADMIN — ACETAMINOPHEN 650 MG: 325 TABLET ORAL at 16:32

## 2019-08-18 RX ADMIN — THIAMINE HYDROCHLORIDE 1000 ML/HR: 100 INJECTION, SOLUTION INTRAMUSCULAR; INTRAVENOUS at 15:55

## 2019-08-18 NOTE — ED PROVIDER NOTES
Subjective   49-year-old white male presents secondary to fall with head injury and subsequent fall with back injury.  Patient states that he has been drinking moonshine and wine coolers today.  He states that initially his legs gave out and he struck the right side of his head this was earlier today.  He subsequently fell and landed on his back.  He is no loss of consciousness.  No chest pain pressure tightness or squeezing.  No abdominal pain.  No fever or recent illness.  He voices no other complaints at this time.            Review of Systems   Constitutional: Negative.  Negative for fever.   HENT: Negative.    Respiratory: Negative.    Cardiovascular: Negative.  Negative for chest pain.   Gastrointestinal: Negative.  Negative for abdominal pain.   Endocrine: Negative.    Genitourinary: Negative.  Negative for dysuria.   Skin: Negative.    Neurological: Negative.    Psychiatric/Behavioral: Negative.    All other systems reviewed and are negative.      Past Medical History:   Diagnosis Date   • Arthritis        No Known Allergies    Past Surgical History:   Procedure Laterality Date   • KNEE SURGERY Bilateral        Family History   Problem Relation Age of Onset   • Hypertension Mother    • Heart disease Mother    • Diabetes Mother    • Cancer Mother        Social History     Socioeconomic History   • Marital status:      Spouse name: Not on file   • Number of children: Not on file   • Years of education: Not on file   • Highest education level: Not on file   Tobacco Use   • Smoking status: Current Every Day Smoker     Types: Electronic Cigarette   • Smokeless tobacco: Never Used   • Tobacco comment: Vapor   Substance and Sexual Activity   • Alcohol use: No   • Drug use: No   • Sexual activity: Defer           Objective   Physical Exam   Constitutional: He is oriented to person, place, and time. He appears well-developed and well-nourished. No distress.   Appears under the influence.   HENT:   Head:  Normocephalic and atraumatic.   Right Ear: External ear normal.   Left Ear: External ear normal.   Nose: Nose normal.   Eyes: Conjunctivae and EOM are normal. Pupils are equal, round, and reactive to light.   Neck: Normal range of motion. Neck supple. No JVD present. No tracheal deviation present.   Cardiovascular: Normal rate, regular rhythm and normal heart sounds.   No murmur heard.  Pulmonary/Chest: Effort normal and breath sounds normal. No respiratory distress. He has no wheezes.   Abdominal: Soft. Bowel sounds are normal. There is no tenderness.   Musculoskeletal: Normal range of motion. He exhibits no edema or deformity.   Neurological: He is alert and oriented to person, place, and time. No cranial nerve deficit.   Skin: Skin is warm and dry. No rash noted. He is not diaphoretic. No erythema. No pallor.   Psychiatric: He has a normal mood and affect. His behavior is normal. Thought content normal.   Nursing note and vitals reviewed.      Procedures           ED Course  ED Course as of Aug 18 1947   Sun Aug 18, 2019   1616 Reviewed CT findings.  Patient now complaining of pelvic/right hip pain.    [JI]   1757 Pt seen with PA.  Reviewed findings.  Agree with assessment and plan.  [BC]   1802 he reports that he was actually drinking alcohol yesterday not today.  He is awake alert and oriented x3 at this point.  His wife was present and stated that patient did not have a seizure.  No loss of consciousness.  No chest pain pressure tightness or squeezing.  Is complaining ofheadache.  He states that he gets headaches intermittently.  He states he gets at least once a month.  States the only thing that helps is a dark room.  [JI]   1900 HA much better. No complaints.  Counseled about the need for follow-up with his primary care.  He voices understanding.  He is to return with any worsening. Was unable to urinate. Declined cath. No symptoms.  [JI]      ED Course User Index  [BC] Hemal Conrad MD  [JI] Brock Glasgow  [facility]                  [facility]  Number of Diagnoses or Management Options  Acute nonintractable headache, unspecified headache type: established and worsening  Injury of head, initial encounter: new and requires workup  Lumbar contusion, initial encounter: new and requires workup     Amount and/or Complexity of Data Reviewed  Clinical lab tests: reviewed and ordered  Tests in the radiology section of CPT®: reviewed and ordered  Discuss the patient with other providers: yes  Independent visualization of images, tracings, or specimens: yes    Risk of Complications, Morbidity, and/or Mortality  Presenting problems: moderate          Final diagnoses:   Injury of head, initial encounter   Lumbar contusion, initial encounter   Acute nonintractable headache, unspecified headache type            Brock Glasgow PA  08/18/19 1909       Brock Glasgow PA  08/18/19 1948

## 2019-09-30 ENCOUNTER — APPOINTMENT (OUTPATIENT)
Dept: GENERAL RADIOLOGY | Facility: HOSPITAL | Age: 49
End: 2019-09-30

## 2019-09-30 ENCOUNTER — HOSPITAL ENCOUNTER (INPATIENT)
Facility: HOSPITAL | Age: 49
LOS: 2 days | Discharge: HOME | End: 2019-10-02
Attending: EMERGENCY MEDICINE | Admitting: SURGERY

## 2019-09-30 ENCOUNTER — ANESTHESIA (OUTPATIENT)
Dept: PERIOP | Facility: HOSPITAL | Age: 49
End: 2019-09-30

## 2019-09-30 ENCOUNTER — ANESTHESIA EVENT (OUTPATIENT)
Dept: PERIOP | Facility: HOSPITAL | Age: 49
End: 2019-09-30

## 2019-09-30 ENCOUNTER — APPOINTMENT (OUTPATIENT)
Dept: CT IMAGING | Facility: HOSPITAL | Age: 49
End: 2019-09-30

## 2019-09-30 DIAGNOSIS — K56.609 SMALL BOWEL OBSTRUCTION (HCC): Primary | ICD-10-CM

## 2019-09-30 PROBLEM — N45.2 ORCHITIS OF RIGHT TESTICLE: Status: RESOLVED | Noted: 2017-12-20 | Resolved: 2019-09-30

## 2019-09-30 LAB
ALBUMIN SERPL-MCNC: 4.89 G/DL (ref 3.5–5.2)
ALBUMIN/GLOB SERPL: 1.7 G/DL
ALP SERPL-CCNC: 88 U/L (ref 39–117)
ALT SERPL W P-5'-P-CCNC: 18 U/L (ref 1–41)
ANION GAP SERPL CALCULATED.3IONS-SCNC: 17.5 MMOL/L (ref 5–15)
AST SERPL-CCNC: 15 U/L (ref 1–40)
BASOPHILS # BLD AUTO: 0.02 10*3/MM3 (ref 0–0.2)
BASOPHILS NFR BLD AUTO: 0.2 % (ref 0–1.5)
BILIRUB SERPL-MCNC: 0.4 MG/DL (ref 0.2–1.2)
BILIRUB UR QL STRIP: NEGATIVE
BUN BLD-MCNC: 19 MG/DL (ref 6–20)
BUN/CREAT SERPL: 16.5 (ref 7–25)
CALCIUM SPEC-SCNC: 10.1 MG/DL (ref 8.6–10.5)
CHLORIDE SERPL-SCNC: 102 MMOL/L (ref 98–107)
CLARITY UR: CLEAR
CO2 SERPL-SCNC: 23.5 MMOL/L (ref 22–29)
COLOR UR: YELLOW
CREAT BLD-MCNC: 1.15 MG/DL (ref 0.76–1.27)
DEPRECATED RDW RBC AUTO: 46.3 FL (ref 37–54)
EOSINOPHIL # BLD AUTO: 0.08 10*3/MM3 (ref 0–0.4)
EOSINOPHIL NFR BLD AUTO: 0.6 % (ref 0.3–6.2)
ERYTHROCYTE [DISTWIDTH] IN BLOOD BY AUTOMATED COUNT: 14.2 % (ref 12.3–15.4)
GFR SERPL CREATININE-BSD FRML MDRD: 68 ML/MIN/1.73
GLOBULIN UR ELPH-MCNC: 2.9 GM/DL
GLUCOSE BLD-MCNC: 132 MG/DL (ref 65–99)
GLUCOSE UR STRIP-MCNC: NEGATIVE MG/DL
HCT VFR BLD AUTO: 49.5 % (ref 37.5–51)
HGB BLD-MCNC: 16.2 G/DL (ref 13–17.7)
HGB UR QL STRIP.AUTO: NEGATIVE
IMM GRANULOCYTES # BLD AUTO: 0.06 10*3/MM3 (ref 0–0.05)
IMM GRANULOCYTES NFR BLD AUTO: 0.5 % (ref 0–0.5)
KETONES UR QL STRIP: ABNORMAL
LEUKOCYTE ESTERASE UR QL STRIP.AUTO: NEGATIVE
LYMPHOCYTES # BLD AUTO: 1.04 10*3/MM3 (ref 0.7–3.1)
LYMPHOCYTES NFR BLD AUTO: 7.9 % (ref 19.6–45.3)
MCH RBC QN AUTO: 29.5 PG (ref 26.6–33)
MCHC RBC AUTO-ENTMCNC: 32.7 G/DL (ref 31.5–35.7)
MCV RBC AUTO: 90.2 FL (ref 79–97)
MONOCYTES # BLD AUTO: 0.75 10*3/MM3 (ref 0.1–0.9)
MONOCYTES NFR BLD AUTO: 5.7 % (ref 5–12)
NEUTROPHILS # BLD AUTO: 11.17 10*3/MM3 (ref 1.7–7)
NEUTROPHILS NFR BLD AUTO: 85.1 % (ref 42.7–76)
NITRITE UR QL STRIP: NEGATIVE
PH UR STRIP.AUTO: 6 [PH] (ref 5–8)
PLATELET # BLD AUTO: 237 10*3/MM3 (ref 140–450)
PMV BLD AUTO: 10.7 FL (ref 6–12)
POTASSIUM BLD-SCNC: 3.9 MMOL/L (ref 3.5–5.2)
PROT SERPL-MCNC: 7.8 G/DL (ref 6–8.5)
PROT UR QL STRIP: ABNORMAL
RBC # BLD AUTO: 5.49 10*6/MM3 (ref 4.14–5.8)
SODIUM BLD-SCNC: 143 MMOL/L (ref 136–145)
SP GR UR STRIP: >=1.03 (ref 1–1.03)
UROBILINOGEN UR QL STRIP: ABNORMAL
WBC NRBC COR # BLD: 13.12 10*3/MM3 (ref 3.4–10.8)

## 2019-09-30 PROCEDURE — 90674 CCIIV4 VAC NO PRSV 0.5 ML IM: CPT | Performed by: SURGERY

## 2019-09-30 PROCEDURE — G0008 ADMIN INFLUENZA VIRUS VAC: HCPCS | Performed by: SURGERY

## 2019-09-30 PROCEDURE — 74176 CT ABD & PELVIS W/O CONTRAST: CPT | Performed by: RADIOLOGY

## 2019-09-30 PROCEDURE — 25010000002 CEFOXITIN: Performed by: SURGERY

## 2019-09-30 PROCEDURE — 85025 COMPLETE CBC W/AUTO DIFF WBC: CPT | Performed by: PHYSICIAN ASSISTANT

## 2019-09-30 PROCEDURE — 80053 COMPREHEN METABOLIC PANEL: CPT | Performed by: PHYSICIAN ASSISTANT

## 2019-09-30 PROCEDURE — 25010000002 HYDROMORPHONE 1 MG/ML SOLUTION: Performed by: EMERGENCY MEDICINE

## 2019-09-30 PROCEDURE — 74018 RADEX ABDOMEN 1 VIEW: CPT

## 2019-09-30 PROCEDURE — 99285 EMERGENCY DEPT VISIT HI MDM: CPT

## 2019-09-30 PROCEDURE — 25010000002 LORAZEPAM PER 2 MG: Performed by: EMERGENCY MEDICINE

## 2019-09-30 PROCEDURE — 81003 URINALYSIS AUTO W/O SCOPE: CPT | Performed by: PHYSICIAN ASSISTANT

## 2019-09-30 PROCEDURE — 25010000002 FENTANYL CITRATE (PF) 100 MCG/2ML SOLUTION: Performed by: ANESTHESIOLOGY

## 2019-09-30 PROCEDURE — 74176 CT ABD & PELVIS W/O CONTRAST: CPT

## 2019-09-30 PROCEDURE — 25010000002 MIDAZOLAM PER 1 MG: Performed by: ANESTHESIOLOGY

## 2019-09-30 PROCEDURE — 0DTJ4ZZ RESECTION OF APPENDIX, PERCUTANEOUS ENDOSCOPIC APPROACH: ICD-10-PCS | Performed by: SURGERY

## 2019-09-30 PROCEDURE — 25010000002 INFLUENZA VAC SUBUNIT QUAD 0.5 ML SUSPENSION PREFILLED SYRINGE: Performed by: SURGERY

## 2019-09-30 PROCEDURE — 25010000002 PROPOFOL 10 MG/ML EMULSION: Performed by: ANESTHESIOLOGY

## 2019-09-30 PROCEDURE — 25010000002 ONDANSETRON PER 1 MG: Performed by: PHYSICIAN ASSISTANT

## 2019-09-30 PROCEDURE — 25010000002 CEFOXITIN PER 1 G: Performed by: ANESTHESIOLOGY

## 2019-09-30 PROCEDURE — 44970 LAPAROSCOPY APPENDECTOMY: CPT | Performed by: SURGERY

## 2019-09-30 PROCEDURE — 25010000002 MORPHINE PER 10 MG: Performed by: SURGERY

## 2019-09-30 PROCEDURE — 74019 RADEX ABDOMEN 2 VIEWS: CPT | Performed by: RADIOLOGY

## 2019-09-30 PROCEDURE — 25010000002 NEOSTIGMINE 10 MG/10ML SOLUTION: Performed by: ANESTHESIOLOGY

## 2019-09-30 PROCEDURE — 99223 1ST HOSP IP/OBS HIGH 75: CPT | Performed by: SURGERY

## 2019-09-30 DEVICE — ENDOSCOPIC LINEAR CUTTER RELOADS BLUE 3.5MM
Type: IMPLANTABLE DEVICE | Status: FUNCTIONAL
Brand: ECHELON; ENDOPATH

## 2019-09-30 DEVICE — THE ECHELON, ECHELON ENDOPATH™ AND ECHELON FLEX™ FAMILIES OF ENDOSCOPIC LINEAR CUTTERS AND RELOADS ARE STERILE, SINGLE PATIENT USE INSTRUMENTS THAT SIMULTANEOUSLY CUT AND STAPLE TISSUE. THERE ARE SIX STAGGERED ROWS OF STAPLES, THREE ON EITHER SIDE OF THE CUT LINE. THE 45 MM INSTRUMENTS HAVE A STAPLE LINE THATIS APPROXIMATELY 45 MM LONG AND A CUT LINE THAT IS APPROXIMATELY 42 MM LONG. THE SHAFT CAN ROTATE FREELY IN BOTH DIRECTIONS AND AN ARTICULATION MECHANISM ON ARTICULATING INSTRUMENTS ENABLES BENDING THE DISTAL PORTIONOF THE SHAFT TO FACILITATE LATERAL ACCESS OF THE OPERATIVE SITE.THE INSTRUMENTS ARE SHIPPED WITHOUT A RELOAD AND MUST BE LOADED PRIOR TO USE. A STAPLE RETAINING CAP ON THE RELOAD PROTECTS THE STAPLE LEG POINTS DURING SHIPPING AND TRANSPORTATION. THE INSTRUMENTS’ LOCK-OUT FEATURE IS DESIGNED TO PREVENT A USED RELOAD FROM BEING REFIRED.
Type: IMPLANTABLE DEVICE | Status: FUNCTIONAL
Brand: ECHELON ENDOPATH

## 2019-09-30 RX ORDER — SODIUM CHLORIDE, SODIUM LACTATE, POTASSIUM CHLORIDE, CALCIUM CHLORIDE 600; 310; 30; 20 MG/100ML; MG/100ML; MG/100ML; MG/100ML
125 INJECTION, SOLUTION INTRAVENOUS CONTINUOUS
Status: DISCONTINUED | OUTPATIENT
Start: 2019-09-30 | End: 2019-09-30

## 2019-09-30 RX ORDER — ONDANSETRON 2 MG/ML
4 INJECTION INTRAMUSCULAR; INTRAVENOUS AS NEEDED
Status: DISCONTINUED | OUTPATIENT
Start: 2019-09-30 | End: 2019-09-30 | Stop reason: HOSPADM

## 2019-09-30 RX ORDER — SODIUM CHLORIDE 0.9 % (FLUSH) 0.9 %
10 SYRINGE (ML) INJECTION AS NEEDED
Status: DISCONTINUED | OUTPATIENT
Start: 2019-09-30 | End: 2019-10-02 | Stop reason: HOSPADM

## 2019-09-30 RX ORDER — SODIUM CHLORIDE 0.9 % (FLUSH) 0.9 %
3 SYRINGE (ML) INJECTION EVERY 12 HOURS SCHEDULED
Status: DISCONTINUED | OUTPATIENT
Start: 2019-09-30 | End: 2019-09-30 | Stop reason: HOSPADM

## 2019-09-30 RX ORDER — MECLIZINE HYDROCHLORIDE 25 MG/1
25 TABLET ORAL 3 TIMES DAILY PRN
Status: DISCONTINUED | OUTPATIENT
Start: 2019-09-30 | End: 2019-10-02 | Stop reason: HOSPADM

## 2019-09-30 RX ORDER — CEFOXITIN 2 G/1
INJECTION, POWDER, FOR SOLUTION INTRAVENOUS AS NEEDED
Status: DISCONTINUED | OUTPATIENT
Start: 2019-09-30 | End: 2019-09-30 | Stop reason: SURG

## 2019-09-30 RX ORDER — MIDAZOLAM HYDROCHLORIDE 1 MG/ML
1 INJECTION INTRAMUSCULAR; INTRAVENOUS
Status: DISCONTINUED | OUTPATIENT
Start: 2019-09-30 | End: 2019-09-30 | Stop reason: HOSPADM

## 2019-09-30 RX ORDER — MAGNESIUM HYDROXIDE 1200 MG/15ML
LIQUID ORAL AS NEEDED
Status: DISCONTINUED | OUTPATIENT
Start: 2019-09-30 | End: 2019-09-30 | Stop reason: HOSPADM

## 2019-09-30 RX ORDER — SODIUM CHLORIDE 0.9 % (FLUSH) 0.9 %
10 SYRINGE (ML) INJECTION AS NEEDED
Status: DISCONTINUED | OUTPATIENT
Start: 2019-09-30 | End: 2019-09-30

## 2019-09-30 RX ORDER — PROPOFOL 10 MG/ML
VIAL (ML) INTRAVENOUS AS NEEDED
Status: DISCONTINUED | OUTPATIENT
Start: 2019-09-30 | End: 2019-09-30 | Stop reason: SURG

## 2019-09-30 RX ORDER — NEOSTIGMINE METHYLSULFATE 1 MG/ML
INJECTION, SOLUTION INTRAVENOUS AS NEEDED
Status: DISCONTINUED | OUTPATIENT
Start: 2019-09-30 | End: 2019-09-30 | Stop reason: SURG

## 2019-09-30 RX ORDER — MIDAZOLAM HYDROCHLORIDE 1 MG/ML
2 INJECTION INTRAMUSCULAR; INTRAVENOUS
Status: DISCONTINUED | OUTPATIENT
Start: 2019-09-30 | End: 2019-09-30 | Stop reason: HOSPADM

## 2019-09-30 RX ORDER — AMOXICILLIN 500 MG/1
500 CAPSULE ORAL EVERY 8 HOURS SCHEDULED
Status: DISCONTINUED | OUTPATIENT
Start: 2019-09-30 | End: 2019-10-02 | Stop reason: HOSPADM

## 2019-09-30 RX ORDER — OXYCODONE AND ACETAMINOPHEN 10; 325 MG/1; MG/1
1 TABLET ORAL EVERY 4 HOURS PRN
Status: DISCONTINUED | OUTPATIENT
Start: 2019-09-30 | End: 2019-10-02 | Stop reason: HOSPADM

## 2019-09-30 RX ORDER — CEFAZOLIN SODIUM 1 G/3ML
INJECTION, POWDER, FOR SOLUTION INTRAMUSCULAR; INTRAVENOUS AS NEEDED
Status: DISCONTINUED | OUTPATIENT
Start: 2019-09-30 | End: 2019-09-30

## 2019-09-30 RX ORDER — NALOXONE HCL 0.4 MG/ML
0.4 VIAL (ML) INJECTION
Status: DISCONTINUED | OUTPATIENT
Start: 2019-09-30 | End: 2019-10-02 | Stop reason: HOSPADM

## 2019-09-30 RX ORDER — ROCURONIUM BROMIDE 10 MG/ML
INJECTION, SOLUTION INTRAVENOUS AS NEEDED
Status: DISCONTINUED | OUTPATIENT
Start: 2019-09-30 | End: 2019-09-30 | Stop reason: SURG

## 2019-09-30 RX ORDER — AMOXICILLIN 875 MG/1
875 TABLET, COATED ORAL 2 TIMES DAILY
COMMUNITY
Start: 2019-09-28 | End: 2019-10-08

## 2019-09-30 RX ORDER — ONDANSETRON 2 MG/ML
4 INJECTION INTRAMUSCULAR; INTRAVENOUS EVERY 6 HOURS PRN
Status: DISCONTINUED | OUTPATIENT
Start: 2019-09-30 | End: 2019-10-02 | Stop reason: HOSPADM

## 2019-09-30 RX ORDER — SODIUM CHLORIDE 0.9 % (FLUSH) 0.9 %
3-10 SYRINGE (ML) INJECTION AS NEEDED
Status: DISCONTINUED | OUTPATIENT
Start: 2019-09-30 | End: 2019-09-30 | Stop reason: HOSPADM

## 2019-09-30 RX ORDER — FENTANYL CITRATE 50 UG/ML
INJECTION, SOLUTION INTRAMUSCULAR; INTRAVENOUS AS NEEDED
Status: DISCONTINUED | OUTPATIENT
Start: 2019-09-30 | End: 2019-09-30 | Stop reason: SURG

## 2019-09-30 RX ORDER — BUPIVACAINE HYDROCHLORIDE AND EPINEPHRINE 5; 5 MG/ML; UG/ML
INJECTION, SOLUTION EPIDURAL; INTRACAUDAL; PERINEURAL AS NEEDED
Status: DISCONTINUED | OUTPATIENT
Start: 2019-09-30 | End: 2019-09-30 | Stop reason: HOSPADM

## 2019-09-30 RX ORDER — ONDANSETRON 4 MG/1
4 TABLET, FILM COATED ORAL EVERY 6 HOURS PRN
Status: DISCONTINUED | OUTPATIENT
Start: 2019-09-30 | End: 2019-10-02 | Stop reason: HOSPADM

## 2019-09-30 RX ORDER — MECLIZINE HYDROCHLORIDE 25 MG/1
25 TABLET ORAL 3 TIMES DAILY PRN
COMMUNITY
End: 2021-07-29

## 2019-09-30 RX ORDER — SODIUM CHLORIDE 9 MG/ML
100 INJECTION, SOLUTION INTRAVENOUS CONTINUOUS
Status: DISCONTINUED | OUTPATIENT
Start: 2019-09-30 | End: 2019-10-02 | Stop reason: HOSPADM

## 2019-09-30 RX ORDER — MEPERIDINE HYDROCHLORIDE 25 MG/ML
12.5 INJECTION INTRAMUSCULAR; INTRAVENOUS; SUBCUTANEOUS
Status: DISCONTINUED | OUTPATIENT
Start: 2019-09-30 | End: 2019-09-30 | Stop reason: HOSPADM

## 2019-09-30 RX ORDER — LORAZEPAM 2 MG/ML
0.5 INJECTION INTRAMUSCULAR ONCE
Status: COMPLETED | OUTPATIENT
Start: 2019-09-30 | End: 2019-09-30

## 2019-09-30 RX ORDER — IPRATROPIUM BROMIDE AND ALBUTEROL SULFATE 2.5; .5 MG/3ML; MG/3ML
3 SOLUTION RESPIRATORY (INHALATION) ONCE AS NEEDED
Status: DISCONTINUED | OUTPATIENT
Start: 2019-09-30 | End: 2019-09-30 | Stop reason: HOSPADM

## 2019-09-30 RX ORDER — GLYCOPYRROLATE 0.2 MG/ML
INJECTION INTRAMUSCULAR; INTRAVENOUS AS NEEDED
Status: DISCONTINUED | OUTPATIENT
Start: 2019-09-30 | End: 2019-09-30 | Stop reason: SURG

## 2019-09-30 RX ORDER — ONDANSETRON 2 MG/ML
4 INJECTION INTRAMUSCULAR; INTRAVENOUS ONCE
Status: COMPLETED | OUTPATIENT
Start: 2019-09-30 | End: 2019-09-30

## 2019-09-30 RX ORDER — OXYCODONE HYDROCHLORIDE AND ACETAMINOPHEN 5; 325 MG/1; MG/1
1 TABLET ORAL ONCE AS NEEDED
Status: DISCONTINUED | OUTPATIENT
Start: 2019-09-30 | End: 2019-09-30 | Stop reason: HOSPADM

## 2019-09-30 RX ADMIN — MORPHINE SULFATE 4 MG: 4 INJECTION, SOLUTION INTRAMUSCULAR; INTRAVENOUS at 23:02

## 2019-09-30 RX ADMIN — CEFOXITIN 2 G: 2 INJECTION, POWDER, FOR SOLUTION INTRAVENOUS at 20:00

## 2019-09-30 RX ADMIN — FENTANYL CITRATE 100 MCG: 50 INJECTION, SOLUTION INTRAMUSCULAR; INTRAVENOUS at 16:55

## 2019-09-30 RX ADMIN — SODIUM CHLORIDE 100 ML/HR: 9 INJECTION, SOLUTION INTRAVENOUS at 19:10

## 2019-09-30 RX ADMIN — NEOSTIGMINE METHYLSULFATE 5 MG: 1 INJECTION, SOLUTION INTRAVENOUS at 16:55

## 2019-09-30 RX ADMIN — GLYCOPYRROLATE 0.8 MG: 0.2 INJECTION INTRAMUSCULAR; INTRAVENOUS at 16:50

## 2019-09-30 RX ADMIN — LORAZEPAM 0.5 MG: 2 INJECTION INTRAMUSCULAR; INTRAVENOUS at 14:07

## 2019-09-30 RX ADMIN — INFLUENZA A VIRUS A/IDAHO/07/2018 (H1N1) ANTIGEN (MDCK CELL DERIVED, PROPIOLACTONE INACTIVATED, INFLUENZA A VIRUS A/INDIANA/08/2018 (H3N2) ANTIGEN (MDCK CELL DERIVED, PROPIOLACTONE INACTIVATED), INFLUENZA B VIRUS B/SINGAPORE/INFTT-16-0610/2016 ANTIGEN (MDCK CELL DERIVED, PROPIOLACTONE INACTIVATED), INFLUENZA B VIRUS B/IOWA/06/2017 ANTIGEN (MDCK CELL DERIVED, PROPIOLACTONE INACTIVATED) 0.5 ML: 15; 15; 15; 15 INJECTION, SUSPENSION INTRAMUSCULAR at 20:41

## 2019-09-30 RX ADMIN — FENTANYL CITRATE 100 MCG: 50 INJECTION, SOLUTION INTRAMUSCULAR; INTRAVENOUS at 16:25

## 2019-09-30 RX ADMIN — HYDROMORPHONE HYDROCHLORIDE 1 MG: 1 INJECTION, SOLUTION INTRAMUSCULAR; INTRAVENOUS; SUBCUTANEOUS at 12:02

## 2019-09-30 RX ADMIN — ROCURONIUM BROMIDE 20 MG: 10 SOLUTION INTRAVENOUS at 17:00

## 2019-09-30 RX ADMIN — SODIUM CHLORIDE 1000 ML: 9 INJECTION, SOLUTION INTRAVENOUS at 12:03

## 2019-09-30 RX ADMIN — MIDAZOLAM HYDROCHLORIDE 2 MG: 1 INJECTION, SOLUTION INTRAMUSCULAR; INTRAVENOUS at 16:25

## 2019-09-30 RX ADMIN — MORPHINE SULFATE 4 MG: 4 INJECTION, SOLUTION INTRAMUSCULAR; INTRAVENOUS at 20:39

## 2019-09-30 RX ADMIN — PROPOFOL 200 MG: 10 INJECTION, EMULSION INTRAVENOUS at 16:25

## 2019-09-30 RX ADMIN — OXYCODONE HYDROCHLORIDE AND ACETAMINOPHEN 1 TABLET: 10; 325 TABLET ORAL at 19:17

## 2019-09-30 RX ADMIN — SODIUM CHLORIDE 1000 ML: 9 INJECTION, SOLUTION INTRAVENOUS at 14:07

## 2019-09-30 RX ADMIN — HYDROMORPHONE HYDROCHLORIDE 1 MG: 1 INJECTION, SOLUTION INTRAMUSCULAR; INTRAVENOUS; SUBCUTANEOUS at 14:08

## 2019-09-30 RX ADMIN — CEFOXITIN 2 G: 2 INJECTION, POWDER, FOR SOLUTION INTRAVENOUS at 16:39

## 2019-09-30 RX ADMIN — AMOXICILLIN 500 MG: 500 CAPSULE ORAL at 22:59

## 2019-09-30 RX ADMIN — FENTANYL CITRATE 50 MCG: 50 INJECTION, SOLUTION INTRAMUSCULAR; INTRAVENOUS at 16:45

## 2019-09-30 RX ADMIN — ONDANSETRON 4 MG: 2 INJECTION, SOLUTION INTRAMUSCULAR; INTRAVENOUS at 11:52

## 2019-09-30 RX ADMIN — ROCURONIUM BROMIDE 30 MG: 10 SOLUTION INTRAVENOUS at 16:25

## 2019-09-30 NOTE — ANESTHESIA POSTPROCEDURE EVALUATION
Patient: Brad Botello    Procedure Summary     Date:  09/30/19 Room / Location:   [facility] /  [facility]    Anesthesia Start:  1629 Anesthesia Stop:      Procedure:  DIAGNOSTIC LAPAROSCOPY, LAPAROSCOPIC APPENDECTOMY (N/A Abdomen) Diagnosis:       Small bowel obstruction (CMS/HCC)      (Small bowel obstruction (CMS/HCC) [K56.609])    Surgeon:  Ehsan Ibrahim MD Provider:      Anesthesia Type:  general ASA Status:  2          Anesthesia Type: general  Last vitals  BP   151/98 (09/30/19 1611)   Temp   98 °F (36.7 °C) (09/30/19 1611)   Pulse   61 (09/30/19 1611)   Resp   14 (09/30/19 1611)     SpO2   98 % (09/30/19 1611)     Post Anesthesia Care and Evaluation    Patient location during evaluation: bedside  Patient participation: complete - patient participated  Level of consciousness: awake and alert  Pain score: 1  Pain management: adequate  Airway patency: patent  Anesthetic complications: No anesthetic complications  PONV Status: none  Cardiovascular status: acceptable  Respiratory status: acceptable  Hydration status: acceptable

## 2019-09-30 NOTE — ANESTHESIA PREPROCEDURE EVALUATION
Anesthesia Evaluation     no history of anesthetic complications:  NPO Solid Status: Waived due to emergency  NPO Liquid Status: Waived due to emergency           Airway   Mallampati: I  TM distance: >3 FB  Neck ROM: full  No difficulty expected  Dental - normal exam     Pulmonary - normal exam   Cardiovascular - normal exam        Neuro/Psych  GI/Hepatic/Renal/Endo      Musculoskeletal     Abdominal  - normal exam    Bowel sounds: normal.   Substance History      OB/GYN          Other                      Anesthesia Plan    ASA 2     general     intravenous induction   Anesthetic plan, all risks, benefits, and alternatives have been provided, discussed and informed consent has been obtained with: patient.

## 2019-09-30 NOTE — ANESTHESIA PROCEDURE NOTES
Airway  Urgency: elective    Date/Time: 9/30/2019 4:30 PM  End Time:9/30/2019 4:30 PM  Airway not difficult    General Information and Staff    Patient location during procedure: OR    Indications and Patient Condition  Indications for airway management: airway protection    Preoxygenated: yes  MILS maintained throughout  Mask difficulty assessment: 1 - vent by mask    Final Airway Details  Final airway type: endotracheal airway      Successful airway: ETT  Cuffed: yes   Successful intubation technique: direct laryngoscopy  Endotracheal tube insertion site: oral  Blade: Bettie  Blade size: 3  ETT size (mm): 7.0  Cormack-Lehane Classification: grade I - full view of glottis  Placement verified by: chest auscultation   Inital cuff pressure (cm H2O): 10  Cuff volume (mL): 15  Measured from: gums  ETT/EBT to gums (cm): 22  Number of attempts at approach: 1  Assessment: lips, teeth, and gum same as pre-op and atraumatic intubation

## 2019-10-01 PROCEDURE — 25010000002 CEFOXITIN: Performed by: SURGERY

## 2019-10-01 PROCEDURE — 25010000002 ENOXAPARIN PER 10 MG: Performed by: SURGERY

## 2019-10-01 PROCEDURE — 25010000002 MORPHINE PER 10 MG: Performed by: SURGERY

## 2019-10-01 RX ADMIN — OXYCODONE HYDROCHLORIDE AND ACETAMINOPHEN 1 TABLET: 10; 325 TABLET ORAL at 01:27

## 2019-10-01 RX ADMIN — CEFOXITIN 2 G: 2 INJECTION, POWDER, FOR SOLUTION INTRAVENOUS at 09:30

## 2019-10-01 RX ADMIN — OXYCODONE HYDROCHLORIDE AND ACETAMINOPHEN 1 TABLET: 10; 325 TABLET ORAL at 05:38

## 2019-10-01 RX ADMIN — AMOXICILLIN 500 MG: 500 CAPSULE ORAL at 20:45

## 2019-10-01 RX ADMIN — SODIUM CHLORIDE 100 ML/HR: 9 INJECTION, SOLUTION INTRAVENOUS at 09:30

## 2019-10-01 RX ADMIN — MORPHINE SULFATE 4 MG: 4 INJECTION, SOLUTION INTRAMUSCULAR; INTRAVENOUS at 14:53

## 2019-10-01 RX ADMIN — MORPHINE SULFATE 4 MG: 4 INJECTION, SOLUTION INTRAMUSCULAR; INTRAVENOUS at 07:28

## 2019-10-01 RX ADMIN — OXYCODONE HYDROCHLORIDE AND ACETAMINOPHEN 1 TABLET: 10; 325 TABLET ORAL at 18:50

## 2019-10-01 RX ADMIN — SODIUM CHLORIDE 100 ML/HR: 9 INJECTION, SOLUTION INTRAVENOUS at 20:46

## 2019-10-01 RX ADMIN — AMOXICILLIN 500 MG: 500 CAPSULE ORAL at 05:38

## 2019-10-01 RX ADMIN — CEFOXITIN 2 G: 2 INJECTION, POWDER, FOR SOLUTION INTRAVENOUS at 01:24

## 2019-10-01 RX ADMIN — AMOXICILLIN 500 MG: 500 CAPSULE ORAL at 13:23

## 2019-10-01 RX ADMIN — ENOXAPARIN SODIUM 40 MG: 40 INJECTION SUBCUTANEOUS at 09:30

## 2019-10-01 RX ADMIN — OXYCODONE HYDROCHLORIDE AND ACETAMINOPHEN 1 TABLET: 10; 325 TABLET ORAL at 10:06

## 2019-10-01 NOTE — PLAN OF CARE
Problem: Patient Care Overview  Goal: Plan of Care Review  Outcome: Ongoing (interventions implemented as appropriate)   10/01/19 0142   Plan of Care Review   Progress improving   Coping/Psychosocial   Plan of Care Reviewed With patient       Problem: Fall Risk (Adult)  Goal: Absence of Fall  Outcome: Ongoing (interventions implemented as appropriate)   10/01/19 0142   Fall Risk (Adult)   Absence of Fall making progress toward outcome       Problem: Pain, Acute (Adult)  Goal: Acceptable Pain Control/Comfort Level  Outcome: Ongoing (interventions implemented as appropriate)   10/01/19 0142   Pain, Acute (Adult)   Acceptable Pain Control/Comfort Level making progress toward outcome

## 2019-10-01 NOTE — PLAN OF CARE
Problem: Patient Care Overview  Goal: Plan of Care Review  Outcome: Ongoing (interventions implemented as appropriate)      Problem: Fall Risk (Adult)  Goal: Identify Related Risk Factors and Signs and Symptoms  Outcome: Ongoing (interventions implemented as appropriate)    Goal: Absence of Fall  Outcome: Ongoing (interventions implemented as appropriate)      Problem: Appendectomy (Adult)  Goal: Signs and Symptoms of Listed Potential Problems Will be Absent, Minimized or Managed (Appendectomy)  Outcome: Ongoing (interventions implemented as appropriate)      Problem: Pain, Acute (Adult)  Goal: Identify Related Risk Factors and Signs and Symptoms  Outcome: Ongoing (interventions implemented as appropriate)    Goal: Acceptable Pain Control/Comfort Level  Outcome: Ongoing (interventions implemented as appropriate)

## 2019-10-02 ENCOUNTER — APPOINTMENT (OUTPATIENT)
Dept: CT IMAGING | Facility: HOSPITAL | Age: 49
End: 2019-10-02

## 2019-10-02 VITALS
OXYGEN SATURATION: 97 % | WEIGHT: 185 LBS | BODY MASS INDEX: 32.78 KG/M2 | DIASTOLIC BLOOD PRESSURE: 64 MMHG | RESPIRATION RATE: 18 BRPM | HEART RATE: 79 BPM | HEIGHT: 63 IN | TEMPERATURE: 98.1 F | SYSTOLIC BLOOD PRESSURE: 126 MMHG

## 2019-10-02 PROCEDURE — 25010000002 ENOXAPARIN PER 10 MG: Performed by: SURGERY

## 2019-10-02 PROCEDURE — 94799 UNLISTED PULMONARY SVC/PX: CPT

## 2019-10-02 PROCEDURE — 74177 CT ABD & PELVIS W/CONTRAST: CPT | Performed by: RADIOLOGY

## 2019-10-02 PROCEDURE — 74177 CT ABD & PELVIS W/CONTRAST: CPT

## 2019-10-02 PROCEDURE — 99024 POSTOP FOLLOW-UP VISIT: CPT | Performed by: SURGERY

## 2019-10-02 PROCEDURE — 0 IOVERSOL 68 % SOLUTION: Performed by: SURGERY

## 2019-10-02 RX ORDER — OXYCODONE HYDROCHLORIDE AND ACETAMINOPHEN 5; 325 MG/1; MG/1
1-2 TABLET ORAL EVERY 4 HOURS PRN
Qty: 15 TABLET | Refills: 0 | Status: SHIPPED | OUTPATIENT
Start: 2019-10-02 | End: 2021-07-29

## 2019-10-02 RX ADMIN — SODIUM CHLORIDE 100 ML/HR: 9 INJECTION, SOLUTION INTRAVENOUS at 08:52

## 2019-10-02 RX ADMIN — OXYCODONE HYDROCHLORIDE AND ACETAMINOPHEN 1 TABLET: 10; 325 TABLET ORAL at 06:00

## 2019-10-02 RX ADMIN — OXYCODONE HYDROCHLORIDE AND ACETAMINOPHEN 1 TABLET: 10; 325 TABLET ORAL at 13:29

## 2019-10-02 RX ADMIN — AMOXICILLIN 500 MG: 500 CAPSULE ORAL at 05:56

## 2019-10-02 RX ADMIN — AMOXICILLIN 500 MG: 500 CAPSULE ORAL at 13:47

## 2019-10-02 RX ADMIN — IOVERSOL 100 ML: 678 INJECTION INTRA-ARTERIAL; INTRAVENOUS at 12:15

## 2019-10-02 RX ADMIN — ENOXAPARIN SODIUM 40 MG: 40 INJECTION SUBCUTANEOUS at 08:52

## 2019-10-02 NOTE — DISCHARGE SUMMARY
Date of Discharge:  10/2/2019    Discharge Diagnosis: Abdominal pain, partial small bowel obstruction    Presenting Problem/History of Present Illness  Small bowel obstruction (CMS/[facility]) [K56.609]       Hospital Course  Patient is a 49-year-old white male presents emergency room with severe abdominal pain with multiple episodes of nausea vomiting.  CT scan suggested a partial bowel obstruction.  Patient had not had any type of previous surgery.  He underwent laparoscopy and appendectomy.  The appendix appeared normal but I performed the appendectomy because he was having most of his pain in the right lower quadrant.  I was concerned he may have inflammatory bowel disease but there were no clear-cut indications of this.  He did have a little creeping fat in his terminal ileum.  He also was found to have dilated distal bowel, but proximal small bowel was normal.  He was able to be discharged home postoperative day #2 doing much better.  Procedures Performed  Procedure(s):  DIAGNOSTIC LAPAROSCOPY, LAPAROSCOPIC APPENDECTOMY       Consults:  Consults     Date and Time Order Name Status Description    9/30/2019 1352 Surgery (on-call MD unless specified)            Condition on Discharge:  Stable    Vital Signs  Temp:  [97.7 °F (36.5 °C)-98.9 °F (37.2 °C)] 97.7 °F (36.5 °C)  Heart Rate:  [63-92] 69  Resp:  [16-19] 16  BP: ()/(52-83) 108/65    Physical Exam:  Physical Exam:  General :  patient is a 49 y.o. male in no acute distress.    HEENT :  normocephalic, pupils equally round and reactive to light, extraocular motions                   intact.                       Chest:       clear bilaterally.  Equal breath sounds.  No rales or rhonchi    Heart:        regular rate and rhythm.    Abdomen: Appropriately tender for this stage postop.  Wounds look good    :            normal external genitalia    Rectal:       not performed    Extremities: no clubbing cyanosis or edema.  Good femoral, popliteal, dorsalis pedis                            and posterior tibial pulse.    Neurologic: patient oriented ×3.  Cranial  nerves grossly intact.  No sensory, cellebellar,                     or motor dysfunction.      Discharge Disposition  Home or Self Care    Discharge Medications     Discharge Medications      New Medications      Instructions Start Date   oxyCODONE-acetaminophen 5-325 MG per tablet  Commonly known as:  PERCOCET   1-2 tablets, Oral, Every 4 Hours PRN         Continue These Medications      Instructions Start Date   amoxicillin 875 MG tablet  Commonly known as:  AMOXIL   875 mg, Oral, 2 Times Daily      meclizine 25 MG tablet  Commonly known as:  ANTIVERT   25 mg, Oral, 3 Times Daily PRN             Discharge Disposition  Patient is discharged home.  He can have regular diet, limited activity, he is in stable condition, his prognosis is good.  He can shower.  He is given a prescription of Percocet 5/325.       Ehsan Ibrahim MD  10/02/19  1:55 PM      Dictated utilizing Dragon dictation

## 2019-10-02 NOTE — DISCHARGE INSTR - APPOINTMENTS
Follow-up appointment has been scheduled with Dr Smith on 10/7/19 at 10:40 am  Office # [phone]

## 2019-10-02 NOTE — PLAN OF CARE
Problem: Patient Care Overview  Goal: Plan of Care Review  Outcome: Ongoing (interventions implemented as appropriate)      Problem: Fall Risk (Adult)  Goal: Absence of Fall  Outcome: Ongoing (interventions implemented as appropriate)   10/02/19 0942   Fall Risk (Adult)   Absence of Fall making progress toward outcome       Problem: Appendectomy (Adult)  Goal: Signs and Symptoms of Listed Potential Problems Will be Absent, Minimized or Managed (Appendectomy)  Outcome: Ongoing (interventions implemented as appropriate)   10/02/19 0942   Goal/Outcome Evaluation   Problems Assessed (Appendectomy) all   Problems Present (Appendectomy) pain     Goal: Anesthesia/Sedation Recovery  Outcome: Ongoing (interventions implemented as appropriate)      Problem: Pain, Acute (Adult)  Goal: Acceptable Pain Control/Comfort Level  Outcome: Ongoing (interventions implemented as appropriate)   10/02/19 0942   Pain, Acute (Adult)   Acceptable Pain Control/Comfort Level making progress toward outcome

## 2019-10-02 NOTE — DISCHARGE INSTR - ACTIVITY
No heavy lifting, more than 10 pounds, pushing, pulling or straining until released by Dr. Ibrahim.    May shower daily, otherwise keep incisions clean and dry.

## 2019-10-02 NOTE — PLAN OF CARE
Problem: Patient Care Overview  Goal: Plan of Care Review  Outcome: Ongoing (interventions implemented as appropriate)   10/01/19 2353   Plan of Care Review   Progress improving   Coping/Psychosocial   Plan of Care Reviewed With patient       Problem: Fall Risk (Adult)  Goal: Absence of Fall  Outcome: Ongoing (interventions implemented as appropriate)   10/01/19 2353   Fall Risk (Adult)   Absence of Fall making progress toward outcome       Problem: Pain, Acute (Adult)  Goal: Acceptable Pain Control/Comfort Level  Outcome: Ongoing (interventions implemented as appropriate)   10/01/19 2353   Pain, Acute (Adult)   Acceptable Pain Control/Comfort Level making progress toward outcome

## 2019-10-03 LAB
LAB AP CASE REPORT: NORMAL
PATH REPORT.FINAL DX SPEC: NORMAL

## 2019-10-08 ENCOUNTER — OFFICE VISIT (OUTPATIENT)
Dept: SURGERY | Facility: CLINIC | Age: 49
End: 2019-10-08

## 2019-10-08 VITALS
HEIGHT: 63 IN | WEIGHT: 200.4 LBS | SYSTOLIC BLOOD PRESSURE: 140 MMHG | BODY MASS INDEX: 35.51 KG/M2 | OXYGEN SATURATION: 97 % | RESPIRATION RATE: 17 BRPM | HEART RATE: 68 BPM | DIASTOLIC BLOOD PRESSURE: 83 MMHG | TEMPERATURE: 98 F

## 2019-10-08 DIAGNOSIS — R10.31 RIGHT LOWER QUADRANT ABDOMINAL PAIN: Primary | ICD-10-CM

## 2019-10-08 PROCEDURE — 99024 POSTOP FOLLOW-UP VISIT: CPT | Performed by: SURGERY

## 2019-10-08 NOTE — PROGRESS NOTES
10/8/2019    Patient Information  Brad Botello   [address]  [place] [address]  1970  [phone] (home)     Chief Complaint  Chief Complaint   Patient presents with   • Post-op Follow-up     Post Op Diagnostic Lap/Appendectomy       HPI  Patient is a 49-year-old white male who is 1 week status post diagnostic laparoscopy and appendectomy.  He says he is better.  No more nausea vomiting that he had preoperatively.  Pathology on his appendix shows its normal.  He did have a little bit of creeping fat on his terminal ileum but I did not see any distinct severe inflammation.  On his operative report and the postop diagnoses I have listed Crohn's disease, but I do not feel he clearly has Crohn's disease at this point.    Past Medical History  Past Medical History:   Diagnosis Date   • Arthritis        Past Surgical History  Past Surgical History:   Procedure Laterality Date   • APPENDECTOMY     • DIAGNOSTIC LAPAROSCOPY N/A 9/30/2019    Procedure: DIAGNOSTIC LAPAROSCOPY, LAPAROSCOPIC APPENDECTOMY;  Surgeon: Ehsan Ibrahim MD;  Location: [facility];  Service: General   • KNEE SURGERY Bilateral        Current Medications  Current Outpatient Medications   Medication Sig Dispense Refill   • amoxicillin (AMOXIL) 875 MG tablet Take 875 mg by mouth 2 (Two) Times a Day.     • meclizine (ANTIVERT) 25 MG tablet Take 25 mg by mouth 3 (Three) Times a Day As Needed for dizziness.     • oxyCODONE-acetaminophen (PERCOCET) 5-325 MG per tablet Take 1-2 tablets by mouth Every 4 (Four) Hours As Needed (Pain). 15 tablet 0     No current facility-administered medications for this visit.        Allergies  Patient has no known allergies.        Physical Exam   Vitals:   Vitals:    10/08/19 1133   BP: 140/83   Pulse: 68   Resp: 17   Temp: 98 °F (36.7 °C)   SpO2: 97%       Physical Exam  General 41-year-old white male no acute distress  Abdomen appropriately tender.  Wounds look good    Assessment   Abdominal pain etiology  unclear    Plan  Return 1 month PRN.  I have suggested we will send him to gastroenterologist should he have persistent difficulties.        This document signed by Ehsan Ibrahim MD October 8, 2019 11:39 AM

## 2021-03-18 ENCOUNTER — BULK ORDERING (OUTPATIENT)
Dept: CASE MANAGEMENT | Facility: OTHER | Age: 51
End: 2021-03-18

## 2021-03-18 DIAGNOSIS — Z23 IMMUNIZATION DUE: ICD-10-CM

## 2021-07-27 DIAGNOSIS — M25.562 PAIN IN BOTH KNEES, UNSPECIFIED CHRONICITY: Primary | ICD-10-CM

## 2021-07-27 DIAGNOSIS — M25.561 PAIN IN BOTH KNEES, UNSPECIFIED CHRONICITY: Primary | ICD-10-CM

## 2021-07-29 ENCOUNTER — OFFICE VISIT (OUTPATIENT)
Dept: ORTHOPEDIC SURGERY | Facility: CLINIC | Age: 51
End: 2021-07-29

## 2021-07-29 ENCOUNTER — HOSPITAL ENCOUNTER (OUTPATIENT)
Dept: GENERAL RADIOLOGY | Facility: HOSPITAL | Age: 51
Discharge: HOME | End: 2021-07-29
Admitting: FAMILY MEDICINE

## 2021-07-29 VITALS
HEART RATE: 80 BPM | TEMPERATURE: 97.9 F | SYSTOLIC BLOOD PRESSURE: 151 MMHG | HEIGHT: 70 IN | WEIGHT: 165 LBS | BODY MASS INDEX: 23.62 KG/M2 | DIASTOLIC BLOOD PRESSURE: 86 MMHG

## 2021-07-29 DIAGNOSIS — M71.22 BAKER'S CYST OF KNEE, LEFT: ICD-10-CM

## 2021-07-29 DIAGNOSIS — Z98.890 H/O ARTHROSCOPIC KNEE SURGERY: ICD-10-CM

## 2021-07-29 DIAGNOSIS — R20.2 PARESTHESIA OF BOTH LOWER EXTREMITIES: ICD-10-CM

## 2021-07-29 DIAGNOSIS — M25.562 CHRONIC PAIN OF BOTH KNEES: Primary | ICD-10-CM

## 2021-07-29 DIAGNOSIS — M25.561 CHRONIC PAIN OF BOTH KNEES: Primary | ICD-10-CM

## 2021-07-29 DIAGNOSIS — M22.2X1 PATELLOFEMORAL PAIN SYNDROME OF BOTH KNEES: ICD-10-CM

## 2021-07-29 DIAGNOSIS — M17.0 PRIMARY OSTEOARTHRITIS OF BOTH KNEES: ICD-10-CM

## 2021-07-29 DIAGNOSIS — G89.29 CHRONIC PAIN OF BOTH KNEES: Primary | ICD-10-CM

## 2021-07-29 DIAGNOSIS — M25.561 PAIN IN BOTH KNEES, UNSPECIFIED CHRONICITY: ICD-10-CM

## 2021-07-29 DIAGNOSIS — M23.309 DEGENERATION OF MENISCUS OF KNEE, UNSPECIFIED LATERALITY: ICD-10-CM

## 2021-07-29 DIAGNOSIS — M25.562 PAIN IN BOTH KNEES, UNSPECIFIED CHRONICITY: ICD-10-CM

## 2021-07-29 DIAGNOSIS — M22.2X2 PATELLOFEMORAL PAIN SYNDROME OF BOTH KNEES: ICD-10-CM

## 2021-07-29 PROCEDURE — 73562 X-RAY EXAM OF KNEE 3: CPT

## 2021-07-29 PROCEDURE — 73562 X-RAY EXAM OF KNEE 3: CPT | Performed by: RADIOLOGY

## 2021-07-29 PROCEDURE — 99204 OFFICE O/P NEW MOD 45 MIN: CPT | Performed by: FAMILY MEDICINE

## 2021-07-29 RX ORDER — MELOXICAM 15 MG/1
TABLET ORAL
COMMUNITY
Start: 2021-07-14

## 2021-07-29 NOTE — PROGRESS NOTES
"New Patient Visit      Patient: Brad Botello  YOB: 1970  Date of Encounter: 07/29/2021  PCP: Adama Smith MD  Referring Provider: ROMEO Da Silva   Brad Botello is a 51 y.o. male who presents to the office today for evaluation of Initial Evaluation and Pain of the Right Knee and Initial Evaluation and Pain of the Left Knee      Chief Complaint:   Chief Complaint   Patient presents with   • Right Knee - Initial Evaluation, Pain   • Left Knee - Initial Evaluation, Pain       HPI:   HPI  New patient presents accompanied by his wife complaining of chronic bilateral knee pain that has been going on for years.  Reports that he has had multiple meniscus surgeries on both knees most recently from Dr. Amado in 2017.  None of these surgeries have given him significant pain relief and he continues to have issues.  He did not think that he had any cartilage left in the knees.  Has not done any injections or recent physical therapy.  Patient also notes that he gets numbness and tingling in both legs below the knees like they are \"asleep\" when he stands for any length of time.  Sometimes will happen after only 5 minutes and sometimes takes longer.  Denies any back problems or rheumatologic issues  Active Problem List:  Patient Active Problem List   Diagnosis   (none) - all problems resolved or deleted       Past Medical History:  Past Medical History:   Diagnosis Date   • Arthritis        Past Surgical History:  Past Surgical History:   Procedure Laterality Date   • APPENDECTOMY     • DIAGNOSTIC LAPAROSCOPY N/A 9/30/2019    Procedure: DIAGNOSTIC LAPAROSCOPY, LAPAROSCOPIC APPENDECTOMY;  Surgeon: Ehsan Ibrahim MD;  Location: [facility];  Service: General   • KNEE SURGERY Bilateral        Family History:  Family History   Problem Relation Age of Onset   • Hypertension Mother    • Heart disease Mother    • Diabetes Mother    • Cancer Mother        Social History:  Social History "     Socioeconomic History   • Marital status:      Spouse name: Not on file   • Number of children: Not on file   • Years of education: Not on file   • Highest education level: Not on file   Tobacco Use   • Smoking status: Former Smoker     Types: Cigarettes     Quit date: 2/15/2012     Years since quittin.4   • Smokeless tobacco: Never Used   Vaping Use   • Vaping Use: Every day   • Substances: Nicotine, CBD, Flavoring   • Devices: Refillable tank   Substance and Sexual Activity   • Alcohol use: Yes     Comment: OCCASIONALLY   • Drug use: No   • Sexual activity: Defer       Medications:  Current Outpatient Medications   Medication Sig Dispense Refill   • meloxicam (Mobic) 15 MG tablet take 1 tablet by oral route  every day with food       No current facility-administered medications for this visit.       Allergies:  No Known Allergies    Review of Systems:   Review of Systems   Constitutional: Positive for activity change. Negative for fever.   Respiratory: Negative for shortness of breath and wheezing.    Cardiovascular: Negative for chest pain.   Musculoskeletal: Positive for arthralgias, gait problem, joint swelling and myalgias. Negative for back pain.   Skin: Negative for color change and wound.   Neurological: Negative for weakness.       Physical Exam:   Physical Exam  Vitals and nursing note reviewed.   Constitutional:       General: He is not in acute distress.     Appearance: Normal appearance.   Cardiovascular:      Pulses:           Dorsalis pedis pulses are 1+ on the right side and 1+ on the left side.        Posterior tibial pulses are 1+ on the right side and 1+ on the left side.   Pulmonary:      Effort: Pulmonary effort is normal. No respiratory distress.   Musculoskeletal:      Lumbar back: No tenderness or bony tenderness. Negative right straight leg raise test and negative left straight leg raise test.      Right knee: Crepitus present. Decreased range of motion. Tenderness (lateral  "patella) present. No LCL laxity or MCL laxity. Abnormal patellar mobility. Normal meniscus.      Instability Tests: Anterior Lachman test negative. Medial Jeovany test negative and lateral Jeovany test negative.      Left knee: Crepitus present. Decreased range of motion. Tenderness present over the medial joint line. No LCL laxity or MCL laxity.Abnormal patellar mobility.      Instability Tests: Anterior Lachman test negative. Medial Jeovany test positive. Lateral Jeovany test negative.      Comments: Suspected Baker's cyst on left medial posterior joint line.  Tender posterior left knee   Skin:     General: Skin is warm and dry.      Findings: No erythema.   Neurological:      General: No focal deficit present.      Mental Status: He is alert.      Sensory: No sensory deficit.      Motor: No weakness.       GENERAL: 51 y.o. male, alert and oriented X 3 in no acute distress.   Visit Vitals  /86   Pulse 80   Temp 97.9 °F (36.6 °C)   Ht 177.8 cm (70\")   Wt 74.8 kg (165 lb)   BMI 23.68 kg/m²       Radiology Results:    XR Knee 3 View Bilateral    Result Date: 7/29/2021  Mild osteoarthritis in both knees.  This report was finalized on 7/29/2021 3:26 PM by Dr. Cornell Colon II, MD.      Assessment & Plan:   Assessment/Plan   Diagnoses and all orders for this visit:    1. Chronic pain of both knees (Primary)  -     EMG & Nerve Conduction Test; Future    2. Primary osteoarthritis of both knees  -     EMG & Nerve Conduction Test; Future    3. Paresthesia of both lower extremities  Comments:  bilateral lower legs below knees  Orders:  -     EMG & Nerve Conduction Test; Future    4. Degeneration of meniscus of knee, unspecified laterality  -     EMG & Nerve Conduction Test; Future    5. H/O arthroscopic knee surgery  Comments:  bilateral. x4 left knee. x1 right knee.    6. Patellofemoral pain syndrome of both knees    7. Baker's cyst of knee, left        MEDICATION ISSUES:  Discussed medication options and " treatment plan of prescribed medication as well as the risks, benefits, and side effects including potential falls, possible impaired driving and metabolic adversities among others. Patient is agreeable to call the office with any worsening of symptoms or onset of side effects. Patient is agreeable to call 911 or go to the nearest ER should he/she begin having SI/HI.     MEDS ORDERED DURING VISIT:  No orders of the defined types were placed in this encounter.  Patient will continue his Mobic and ibuprofen at home exercise regimen for the knees as well as the low back and piriformis.  He is not particularly tender in these areas but is possible source of his leg paresthesias.  Cause of leg paresthesias is not immediately evident.  Will order an EMG and consider further intervention with physical therapy.  Patient will follow up for viscosupplementation injection in the left knee start home exercises.           This document has been electronically signed by Brock Good DO   July 29, 2021 16:59 EDT    Part of this note may be an electronic transcription/translation of spoken language to printed text using the Dragon Dictation System.

## 2021-09-14 ENCOUNTER — OFFICE VISIT (OUTPATIENT)
Dept: ORTHOPEDIC SURGERY | Facility: CLINIC | Age: 51
End: 2021-09-14

## 2021-09-14 VITALS
HEIGHT: 70 IN | BODY MASS INDEX: 24.34 KG/M2 | DIASTOLIC BLOOD PRESSURE: 80 MMHG | WEIGHT: 170 LBS | HEART RATE: 77 BPM | SYSTOLIC BLOOD PRESSURE: 126 MMHG

## 2021-09-14 DIAGNOSIS — M62.838 MUSCLE SPASM: ICD-10-CM

## 2021-09-14 DIAGNOSIS — M17.0 PRIMARY OSTEOARTHRITIS OF BOTH KNEES: ICD-10-CM

## 2021-09-14 DIAGNOSIS — G57.03 PIRIFORMIS SYNDROME OF BOTH SIDES: Primary | ICD-10-CM

## 2021-09-14 DIAGNOSIS — M23.309 DEGENERATION OF MENISCUS OF KNEE, UNSPECIFIED LATERALITY: ICD-10-CM

## 2021-09-14 DIAGNOSIS — M25.562 CHRONIC PAIN OF BOTH KNEES: ICD-10-CM

## 2021-09-14 DIAGNOSIS — M22.2X2 PATELLOFEMORAL PAIN SYNDROME OF BOTH KNEES: ICD-10-CM

## 2021-09-14 DIAGNOSIS — G89.29 CHRONIC PAIN OF BOTH KNEES: ICD-10-CM

## 2021-09-14 DIAGNOSIS — M99.06 SEGMENTAL AND SOMATIC DYSFUNCTION OF LOWER EXTREMITY: ICD-10-CM

## 2021-09-14 DIAGNOSIS — M25.561 CHRONIC PAIN OF BOTH KNEES: ICD-10-CM

## 2021-09-14 DIAGNOSIS — M22.2X1 PATELLOFEMORAL PAIN SYNDROME OF BOTH KNEES: ICD-10-CM

## 2021-09-14 DIAGNOSIS — R20.2 PARESTHESIA OF BOTH LOWER EXTREMITIES: ICD-10-CM

## 2021-09-14 DIAGNOSIS — M79.10 MUSCLE PAIN: ICD-10-CM

## 2021-09-14 PROCEDURE — 99214 OFFICE O/P EST MOD 30 MIN: CPT | Performed by: FAMILY MEDICINE

## 2021-09-14 PROCEDURE — 98926 OSTEOPATH MANJ 3-4 REGIONS: CPT | Performed by: FAMILY MEDICINE

## 2021-09-14 RX ORDER — METHOCARBAMOL 500 MG/1
500 TABLET, FILM COATED ORAL EVERY 8 HOURS PRN
Qty: 60 TABLET | Refills: 1 | Status: SHIPPED | OUTPATIENT
Start: 2021-09-14

## 2021-09-14 NOTE — PROGRESS NOTES
"Follow Up Visit      Patient: Brad Botello  YOB: 1970  Date of Encounter: 09/14/2021  PCP: Adama Smith MD    Subjective   Brad Botello is a 51 y.o. male who presents to the office today as a follow up for Pain, Follow-up, Numbness, and Tingling of the Left Knee and Pain, Follow-up, Numbness, and Tingling of the Right Knee      Chief Complaint:   Chief Complaint   Patient presents with   • Left Knee - Pain, Follow-up, Numbness, Tingling   • Right Knee - Pain, Follow-up, Numbness, Tingling       HPI:   HPI patient presents for follow-up for bilateral knee pain and paresthesias down the legs.  Has not yet been approved for his Visco injections.  Did have his EMG he needs to get results.  Doing home exercises for the knee and taking Mobic and is not seeing much improvement at this time.  States that the pain and paresthesias to go up into his gluteal area but did not progress all the way to the back    Allergies:  No Known Allergies    Review of Systems:   Review of Systems   Constitutional: Positive for activity change. Negative for fever.   Respiratory: Negative for shortness of breath and wheezing.    Cardiovascular: Negative for chest pain.   Musculoskeletal: Positive for arthralgias, gait problem, joint swelling and myalgias. Negative for back pain.   Skin: Negative for color change and wound.   Neurological: Negative for weakness.       Visit Vitals  /80   Pulse 77   Ht 177.8 cm (70\")   Wt 77.1 kg (170 lb)   BMI 24.39 kg/m²       Physical Exam:   Physical Exam  Vitals and nursing note reviewed.   Constitutional:       General: He is not in acute distress.     Appearance: Normal appearance.   Cardiovascular:      Pulses:           Dorsalis pedis pulses are 1+ on the right side and 1+ on the left side.        Posterior tibial pulses are 1+ on the right side and 1+ on the left side.   Pulmonary:      Effort: Pulmonary effort is normal. No respiratory distress.   Musculoskeletal: "      Lumbar back: No tenderness or bony tenderness. Negative right straight leg raise test and negative left straight leg raise test.      Right hip: Tenderness present.      Left hip: Tenderness present.      Right knee: Crepitus present. Decreased range of motion. Tenderness (lateral patella) present. No LCL laxity or MCL laxity. Abnormal patellar mobility. Normal meniscus.      Instability Tests: Anterior Lachman test negative. Medial Jeovany test negative and lateral Jeovany test negative.      Left knee: Crepitus present. Decreased range of motion. Tenderness present over the medial joint line. No LCL laxity or MCL laxity.Abnormal patellar mobility.      Instability Tests: Anterior Lachman test negative. Medial Jeovany test positive. Lateral Jeovany test negative.        Legs:       Comments: Trigger points in bilateral piriformis/gluteal musculature with recreation of the patient's pain and paresthesias going down the legs    Suspected Baker's cyst on left medial posterior joint line.  Tender posterior left knee   Skin:     General: Skin is warm and dry.      Findings: No erythema.   Neurological:      General: No focal deficit present.      Mental Status: He is alert.      Sensory: No sensory deficit.      Motor: No weakness.       Osteopathic Manipulative Treatment - OMT - Procedure Note    OMT Procedure  Indications: TART findings, muscle spasm, pain    Risks and benefits discussed and patient gave verbal consent to treat    Regions treated: Pelvis, Sacrum and Lower Extremity    Findings: Pelvis Right Posterior Innominate Rotation, Sacrum Right Sacral Restriction or Lower Extremity Right Psoas Restriction, Right Piriformis Restriction, Left Psoas Restriction or Left Piriformis Restriction    Modalities: HVLA, Muscle Energy, Direct Myofascial Release and Indirect Myofascial Release    Patient reports decreased pain, improved range of motion, and improved functionality after treatment. There were no  adverse effects.      EMG lower extremities: 8/17/2021  Electrodiagnostic evidence suggestive but not conclusive for mild bilateral tibial neuropathies.  When combined with physical exam findings his presentation is suggestive of piriformis syndrome.  Consider therapy, stretching, piriformis trigger point injections    Assessment & Plan:   Assessment/Plan   Diagnoses and all orders for this visit:    1. Piriformis syndrome of both sides (Primary)  -     methocarbamol (Robaxin) 500 MG tablet; Take 1 tablet by mouth Every 8 (Eight) Hours As Needed for Muscle Spasms.  Dispense: 60 tablet; Refill: 1    2. Muscle pain  -     methocarbamol (Robaxin) 500 MG tablet; Take 1 tablet by mouth Every 8 (Eight) Hours As Needed for Muscle Spasms.  Dispense: 60 tablet; Refill: 1    3. Muscle spasm  -     methocarbamol (Robaxin) 500 MG tablet; Take 1 tablet by mouth Every 8 (Eight) Hours As Needed for Muscle Spasms.  Dispense: 60 tablet; Refill: 1    4. Chronic pain of both knees    5. Primary osteoarthritis of both knees    6. Paresthesia of both lower extremities  -     methocarbamol (Robaxin) 500 MG tablet; Take 1 tablet by mouth Every 8 (Eight) Hours As Needed for Muscle Spasms.  Dispense: 60 tablet; Refill: 1    7. Degeneration of meniscus of knee, unspecified laterality    8. Patellofemoral pain syndrome of both knees        Visit Diagnoses:    ICD-10-CM ICD-9-CM   1. Piriformis syndrome of both sides  G57.03 355.0   2. Muscle pain  M79.10 729.1   3. Muscle spasm  M62.838 728.85   4. Chronic pain of both knees  M25.561 719.46    M25.562 338.29    G89.29    5. Primary osteoarthritis of both knees  M17.0 715.16   6. Paresthesia of both lower extremities  R20.2 782.0   7. Degeneration of meniscus of knee, unspecified laterality  M23.309 717.5   8. Patellofemoral pain syndrome of both knees  M22.2X1 719.46    M22.2X2        MEDICATION ISSUES:  Discussed medication options and treatment plan of prescribed medication as well as the  risks, benefits, and side effects including potential falls, possible impaired driving and metabolic adversities among others. Patient is agreeable to call the office with any worsening of symptoms or onset of side effects. Patient is agreeable to call 911 or go to the nearest ER should he/she begin having SI/HI.     MEDS ORDERED DURING VISIT:  New Medications Ordered This Visit   Medications   • methocarbamol (Robaxin) 500 MG tablet     Sig: Take 1 tablet by mouth Every 8 (Eight) Hours As Needed for Muscle Spasms.     Dispense:  60 tablet     Refill:  1   Reviewed EMG with patient.  Most of the patient's symptomatology was completely recreated by palpation of the piriformis muscle bilaterally.  Patient responded well to OMT/stretching in the office with significant improvement in his leg pain paresthesias and even his knee pain.  Follow-up in 1 week for repeat treatment.  Gave patient home exercises to do twice daily.  Muscle relaxer to take at bedtime.  Continue Mobic.  Consider referral for physical therapy.            This document has been electronically signed by Brock Good DO   September 14, 2021 16:09 EDT    Part of this note may be an electronic transcription/translation of spoken language to printed text using the Dragon Dictation System.

## 2021-09-21 ENCOUNTER — OFFICE VISIT (OUTPATIENT)
Dept: ORTHOPEDIC SURGERY | Facility: CLINIC | Age: 51
End: 2021-09-21

## 2021-09-21 VITALS
HEART RATE: 65 BPM | BODY MASS INDEX: 25.05 KG/M2 | HEIGHT: 70 IN | SYSTOLIC BLOOD PRESSURE: 109 MMHG | WEIGHT: 175 LBS | DIASTOLIC BLOOD PRESSURE: 73 MMHG

## 2021-09-21 DIAGNOSIS — M17.0 PRIMARY OSTEOARTHRITIS OF BOTH KNEES: ICD-10-CM

## 2021-09-21 DIAGNOSIS — M99.06 SEGMENTAL AND SOMATIC DYSFUNCTION OF LOWER EXTREMITY: ICD-10-CM

## 2021-09-21 DIAGNOSIS — M23.309 DEGENERATION OF MENISCUS OF KNEE, UNSPECIFIED LATERALITY: Primary | ICD-10-CM

## 2021-09-21 DIAGNOSIS — M79.10 MUSCLE PAIN: ICD-10-CM

## 2021-09-21 DIAGNOSIS — M25.561 CHRONIC PAIN OF BOTH KNEES: ICD-10-CM

## 2021-09-21 DIAGNOSIS — G57.03 PIRIFORMIS SYNDROME OF BOTH SIDES: ICD-10-CM

## 2021-09-21 DIAGNOSIS — M25.562 CHRONIC PAIN OF BOTH KNEES: ICD-10-CM

## 2021-09-21 DIAGNOSIS — M62.838 MUSCLE SPASM: ICD-10-CM

## 2021-09-21 DIAGNOSIS — G89.29 CHRONIC PAIN OF BOTH KNEES: ICD-10-CM

## 2021-09-21 PROCEDURE — 98926 OSTEOPATH MANJ 3-4 REGIONS: CPT | Performed by: FAMILY MEDICINE

## 2021-09-21 PROCEDURE — 99213 OFFICE O/P EST LOW 20 MIN: CPT | Performed by: FAMILY MEDICINE

## 2021-09-21 NOTE — PROGRESS NOTES
"Follow Up Visit      Patient: Brad Botello  YOB: 1970  Date of Encounter: 09/21/2021  No ref. provider found     Subjective   Brad Botello is a 51 y.o. male who presents to the office today for Osteopathic Manipulative Treatment for Pain, Edema, and Follow-up of the Left Knee and Pain, Edema, and Follow-up of the Right Knee      HPI:   HPI  Patient presents for OMT for piriformis syndrome, back pain, leg pain.  Has been doing his home stretches and notes that he is about 20% improved overall since starting them.  Reports that the knee pain is improved considerably but still gets some pain on the left side   Visit Vitals  /73   Pulse 65   Ht 177.8 cm (70\")   Wt 79.4 kg (175 lb)   BMI 25.11 kg/m²     Physical Exam  Vitals and nursing note reviewed.   Constitutional:       General: He is not in acute distress.     Appearance: Normal appearance.   Pulmonary:      Effort: Pulmonary effort is normal. No respiratory distress.   Musculoskeletal:      Lumbar back: No tenderness or bony tenderness. Negative right straight leg raise test and negative left straight leg raise test.      Right hip: Tenderness present.      Left hip: Tenderness present.      Right knee: Crepitus present. Decreased range of motion. No tenderness. No LCL laxity or MCL laxity. Abnormal patellar mobility. Normal meniscus.      Instability Tests: Anterior Lachman test negative. Medial Jeovany test negative and lateral Jeovany test negative.      Left knee: Crepitus present. Decreased range of motion. Tenderness present over the medial joint line. No LCL laxity or MCL laxity.Abnormal patellar mobility.      Instability Tests: Anterior Lachman test negative. Medial Jeovany test positive. Lateral Jeovany test negative.        Legs:       Comments: Trigger points in bilateral piriformis/gluteal musculature with recreation of the patient's pain and paresthesias going down the legs improved from previous.    Suspected Baker's " cyst on left medial posterior joint line.  Knee pain intensity improved and resolved on the right   Skin:     General: Skin is warm and dry.      Findings: No erythema.   Neurological:      General: No focal deficit present.      Mental Status: He is alert.      Sensory: No sensory deficit.      Motor: No weakness.         OMT Procedure  Indications: TART findings, muscle spasm, pain    Risks and benefits discussed and patient gave verbal consent to treat    Regions treated: L-Spine, Pelvis, Sacrum and Lower Extremity    Findings: Lumbar Spine Generalized Lumbar Restriction or L4 ERS L, Pelvis Right Posterior Innominate Rotation, Sacrum Right Sacral Restriction or Lower Extremity Right Psoas Restriction, Right Piriformis Restriction, Left Psoas Restriction or Left Piriformis Restriction    Modalities: HVLA, Muscle Energy, Direct Myofascial Release and Indirect Myofascial Release    Patient reports decreased pain, improved range of motion, and improved functionality after treatment. There were no adverse effects.    Assessment & Plan:   Assessment/Plan   Diagnoses and all orders for this visit:    1. Degeneration of meniscus of knee, unspecified laterality (Primary)    2. Chronic pain of both knees    3. Piriformis syndrome of both sides    4. Muscle pain    5. Muscle spasm    6. Primary osteoarthritis of both knees    7. Segmental and somatic dysfunction of lower extremity       Follow-up in 1 week for reevaluation         This document has been electronically signed by Brock Good DO   September 21, 2021 16:52 EDT    Part of this note may be an electronic transcription/translation of spoken language to printed text using the Dragon Dictation System.

## 2021-11-15 ENCOUNTER — APPOINTMENT (OUTPATIENT)
Dept: ULTRASOUND IMAGING | Facility: HOSPITAL | Age: 51
End: 2021-11-15

## 2021-11-15 ENCOUNTER — APPOINTMENT (OUTPATIENT)
Dept: CT IMAGING | Facility: HOSPITAL | Age: 51
End: 2021-11-15

## 2021-11-15 ENCOUNTER — HOSPITAL ENCOUNTER (EMERGENCY)
Facility: HOSPITAL | Age: 51
Discharge: HOME | End: 2021-11-15
Attending: EMERGENCY MEDICINE | Admitting: EMERGENCY MEDICINE

## 2021-11-15 VITALS
DIASTOLIC BLOOD PRESSURE: 81 MMHG | SYSTOLIC BLOOD PRESSURE: 124 MMHG | RESPIRATION RATE: 18 BRPM | WEIGHT: 165.34 LBS | OXYGEN SATURATION: 99 % | TEMPERATURE: 97.5 F | BODY MASS INDEX: 25.06 KG/M2 | HEIGHT: 68 IN | HEART RATE: 88 BPM

## 2021-11-15 DIAGNOSIS — R10.9 RIGHT FLANK PAIN: ICD-10-CM

## 2021-11-15 DIAGNOSIS — R10.9 RIGHT SIDED ABDOMINAL PAIN: ICD-10-CM

## 2021-11-15 DIAGNOSIS — N20.0 KIDNEY STONE: Primary | ICD-10-CM

## 2021-11-15 LAB
ALBUMIN SERPL-MCNC: 4.48 G/DL (ref 3.5–5.2)
ALBUMIN/GLOB SERPL: 1.6 G/DL
ALP SERPL-CCNC: 86 U/L (ref 39–117)
ALT SERPL W P-5'-P-CCNC: 20 U/L (ref 1–41)
AMPHET+METHAMPHET UR QL: POSITIVE
AMPHETAMINES UR QL: POSITIVE
AMYLASE SERPL-CCNC: 66 U/L (ref 28–100)
ANION GAP SERPL CALCULATED.3IONS-SCNC: 14.2 MMOL/L (ref 5–15)
AST SERPL-CCNC: 19 U/L (ref 1–40)
BARBITURATES UR QL SCN: NEGATIVE
BASOPHILS # BLD AUTO: 0.05 10*3/MM3 (ref 0–0.2)
BASOPHILS NFR BLD AUTO: 0.4 % (ref 0–1.5)
BENZODIAZ UR QL SCN: NEGATIVE
BILIRUB SERPL-MCNC: 0.5 MG/DL (ref 0–1.2)
BILIRUB UR QL STRIP: NEGATIVE
BUN SERPL-MCNC: 12 MG/DL (ref 6–20)
BUN/CREAT SERPL: 11.2 (ref 7–25)
BUPRENORPHINE SERPL-MCNC: NEGATIVE NG/ML
CALCIUM SPEC-SCNC: 9.7 MG/DL (ref 8.6–10.5)
CANNABINOIDS SERPL QL: POSITIVE
CHLORIDE SERPL-SCNC: 97 MMOL/L (ref 98–107)
CK SERPL-CCNC: 119 U/L (ref 20–200)
CLARITY UR: CLEAR
CO2 SERPL-SCNC: 24.8 MMOL/L (ref 22–29)
COCAINE UR QL: NEGATIVE
COLOR UR: YELLOW
CREAT SERPL-MCNC: 1.07 MG/DL (ref 0.76–1.27)
CRP SERPL-MCNC: <0.3 MG/DL (ref 0–0.5)
DEPRECATED RDW RBC AUTO: 42.5 FL (ref 37–54)
EOSINOPHIL # BLD AUTO: 0.04 10*3/MM3 (ref 0–0.4)
EOSINOPHIL NFR BLD AUTO: 0.3 % (ref 0.3–6.2)
ERYTHROCYTE [DISTWIDTH] IN BLOOD BY AUTOMATED COUNT: 13.2 % (ref 12.3–15.4)
ERYTHROCYTE [SEDIMENTATION RATE] IN BLOOD: 1 MM/HR (ref 0–15)
ETHANOL BLD-MCNC: <10 MG/DL (ref 0–10)
ETHANOL UR QL: <0.01 %
GFR SERPL CREATININE-BSD FRML MDRD: 73 ML/MIN/1.73
GLOBULIN UR ELPH-MCNC: 2.7 GM/DL
GLUCOSE SERPL-MCNC: 159 MG/DL (ref 65–99)
GLUCOSE UR STRIP-MCNC: NEGATIVE MG/DL
HCT VFR BLD AUTO: 50.4 % (ref 37.5–51)
HGB BLD-MCNC: 16.6 G/DL (ref 13–17.7)
HGB UR QL STRIP.AUTO: NEGATIVE
IMM GRANULOCYTES # BLD AUTO: 0.08 10*3/MM3 (ref 0–0.05)
IMM GRANULOCYTES NFR BLD AUTO: 0.6 % (ref 0–0.5)
KETONES UR QL STRIP: NEGATIVE
LEUKOCYTE ESTERASE UR QL STRIP.AUTO: NEGATIVE
LIPASE SERPL-CCNC: 20 U/L (ref 13–60)
LYMPHOCYTES # BLD AUTO: 0.78 10*3/MM3 (ref 0.7–3.1)
LYMPHOCYTES NFR BLD AUTO: 5.8 % (ref 19.6–45.3)
MCH RBC QN AUTO: 29 PG (ref 26.6–33)
MCHC RBC AUTO-ENTMCNC: 32.9 G/DL (ref 31.5–35.7)
MCV RBC AUTO: 88.1 FL (ref 79–97)
METHADONE UR QL SCN: NEGATIVE
MONOCYTES # BLD AUTO: 0.71 10*3/MM3 (ref 0.1–0.9)
MONOCYTES NFR BLD AUTO: 5.3 % (ref 5–12)
NEUTROPHILS NFR BLD AUTO: 11.75 10*3/MM3 (ref 1.7–7)
NEUTROPHILS NFR BLD AUTO: 87.6 % (ref 42.7–76)
NITRITE UR QL STRIP: NEGATIVE
NRBC BLD AUTO-RTO: 0 /100 WBC (ref 0–0.2)
OPIATES UR QL: NEGATIVE
OXYCODONE UR QL SCN: NEGATIVE
PCP UR QL SCN: NEGATIVE
PH UR STRIP.AUTO: 8 [PH] (ref 5–8)
PLATELET # BLD AUTO: 270 10*3/MM3 (ref 140–450)
PMV BLD AUTO: 10.6 FL (ref 6–12)
POTASSIUM SERPL-SCNC: 3.9 MMOL/L (ref 3.5–5.2)
PROPOXYPH UR QL: NEGATIVE
PROT SERPL-MCNC: 7.2 G/DL (ref 6–8.5)
PROT UR QL STRIP: NEGATIVE
QT INTERVAL: 388 MS
QTC INTERVAL: 450 MS
RBC # BLD AUTO: 5.72 10*6/MM3 (ref 4.14–5.8)
SODIUM SERPL-SCNC: 136 MMOL/L (ref 136–145)
SP GR UR STRIP: 1.01 (ref 1–1.03)
TRICYCLICS UR QL SCN: NEGATIVE
TROPONIN T SERPL-MCNC: <0.01 NG/ML (ref 0–0.03)
TROPONIN T SERPL-MCNC: <0.01 NG/ML (ref 0–0.03)
UROBILINOGEN UR QL STRIP: NORMAL
WBC # BLD AUTO: 13.41 10*3/MM3 (ref 3.4–10.8)

## 2021-11-15 PROCEDURE — 76705 ECHO EXAM OF ABDOMEN: CPT

## 2021-11-15 PROCEDURE — 93005 ELECTROCARDIOGRAM TRACING: CPT | Performed by: EMERGENCY MEDICINE

## 2021-11-15 PROCEDURE — 99283 EMERGENCY DEPT VISIT LOW MDM: CPT

## 2021-11-15 PROCEDURE — 80053 COMPREHEN METABOLIC PANEL: CPT | Performed by: EMERGENCY MEDICINE

## 2021-11-15 PROCEDURE — 74176 CT ABD & PELVIS W/O CONTRAST: CPT | Performed by: RADIOLOGY

## 2021-11-15 PROCEDURE — 81003 URINALYSIS AUTO W/O SCOPE: CPT | Performed by: EMERGENCY MEDICINE

## 2021-11-15 PROCEDURE — 82077 ASSAY SPEC XCP UR&BREATH IA: CPT | Performed by: EMERGENCY MEDICINE

## 2021-11-15 PROCEDURE — 99282 EMERGENCY DEPT VISIT SF MDM: CPT

## 2021-11-15 PROCEDURE — 93010 ELECTROCARDIOGRAM REPORT: CPT | Performed by: INTERNAL MEDICINE

## 2021-11-15 PROCEDURE — 80306 DRUG TEST PRSMV INSTRMNT: CPT | Performed by: EMERGENCY MEDICINE

## 2021-11-15 PROCEDURE — 82150 ASSAY OF AMYLASE: CPT | Performed by: EMERGENCY MEDICINE

## 2021-11-15 PROCEDURE — 85652 RBC SED RATE AUTOMATED: CPT | Performed by: EMERGENCY MEDICINE

## 2021-11-15 PROCEDURE — 25010000002 HYDROMORPHONE PER 4 MG: Performed by: EMERGENCY MEDICINE

## 2021-11-15 PROCEDURE — 25010000002 HYDROMORPHONE 1 MG/ML SOLUTION: Performed by: EMERGENCY MEDICINE

## 2021-11-15 PROCEDURE — 96375 TX/PRO/DX INJ NEW DRUG ADDON: CPT

## 2021-11-15 PROCEDURE — 76705 ECHO EXAM OF ABDOMEN: CPT | Performed by: RADIOLOGY

## 2021-11-15 PROCEDURE — 82550 ASSAY OF CK (CPK): CPT | Performed by: EMERGENCY MEDICINE

## 2021-11-15 PROCEDURE — 74176 CT ABD & PELVIS W/O CONTRAST: CPT

## 2021-11-15 PROCEDURE — 96374 THER/PROPH/DIAG INJ IV PUSH: CPT

## 2021-11-15 PROCEDURE — 85025 COMPLETE CBC W/AUTO DIFF WBC: CPT | Performed by: EMERGENCY MEDICINE

## 2021-11-15 PROCEDURE — 96376 TX/PRO/DX INJ SAME DRUG ADON: CPT

## 2021-11-15 PROCEDURE — 83690 ASSAY OF LIPASE: CPT | Performed by: EMERGENCY MEDICINE

## 2021-11-15 PROCEDURE — 84484 ASSAY OF TROPONIN QUANT: CPT | Performed by: EMERGENCY MEDICINE

## 2021-11-15 PROCEDURE — 86140 C-REACTIVE PROTEIN: CPT | Performed by: EMERGENCY MEDICINE

## 2021-11-15 PROCEDURE — 25010000002 ONDANSETRON PER 1 MG: Performed by: EMERGENCY MEDICINE

## 2021-11-15 RX ORDER — PROMETHAZINE HYDROCHLORIDE 25 MG/1
25 TABLET ORAL EVERY 6 HOURS PRN
Qty: 30 TABLET | Refills: 0 | Status: SHIPPED | OUTPATIENT
Start: 2021-11-15

## 2021-11-15 RX ORDER — ONDANSETRON 2 MG/ML
4 INJECTION INTRAMUSCULAR; INTRAVENOUS ONCE
Status: COMPLETED | OUTPATIENT
Start: 2021-11-15 | End: 2021-11-15

## 2021-11-15 RX ORDER — SODIUM CHLORIDE 0.9 % (FLUSH) 0.9 %
10 SYRINGE (ML) INJECTION AS NEEDED
Status: DISCONTINUED | OUTPATIENT
Start: 2021-11-15 | End: 2021-11-15 | Stop reason: HOSPADM

## 2021-11-15 RX ORDER — HYDROCODONE BITARTRATE AND ACETAMINOPHEN 5; 325 MG/1; MG/1
1 TABLET ORAL EVERY 6 HOURS PRN
Qty: 10 TABLET | Refills: 0 | Status: SHIPPED | OUTPATIENT
Start: 2021-11-15

## 2021-11-15 RX ORDER — HYDROMORPHONE HYDROCHLORIDE 1 MG/ML
0.5 INJECTION, SOLUTION INTRAMUSCULAR; INTRAVENOUS; SUBCUTANEOUS ONCE
Status: COMPLETED | OUTPATIENT
Start: 2021-11-15 | End: 2021-11-15

## 2021-11-15 RX ADMIN — SODIUM CHLORIDE 1000 ML: 9 INJECTION, SOLUTION INTRAVENOUS at 09:03

## 2021-11-15 RX ADMIN — HYDROMORPHONE HYDROCHLORIDE 1 MG: 1 INJECTION, SOLUTION INTRAMUSCULAR; INTRAVENOUS; SUBCUTANEOUS at 09:04

## 2021-11-15 RX ADMIN — ONDANSETRON 4 MG: 2 INJECTION INTRAMUSCULAR; INTRAVENOUS at 09:04

## 2021-11-15 RX ADMIN — HYDROMORPHONE HYDROCHLORIDE 0.5 MG: 1 INJECTION, SOLUTION INTRAMUSCULAR; INTRAVENOUS; SUBCUTANEOUS at 11:53

## 2021-11-18 NOTE — ED PROVIDER NOTES
Subjective     History provided by:  Patient   used: No    Abdominal Pain  Pain location:  RUQ and RLQ  Pain quality: aching, cramping and dull    Pain radiates to:  Does not radiate  Pain severity:  Mild  Onset quality:  Gradual  Timing:  Constant  Progression:  Unchanged  Chronicity:  New  Context: not alcohol use, not awakening from sleep, not diet changes, not eating, not laxative use, not medication withdrawal, not previous surgeries, not recent illness, not recent sexual activity, not recent travel, not retching, not sick contacts, not suspicious food intake and not trauma    Relieved by:  Nothing  Worsened by:  Nothing  Ineffective treatments:  None tried  Associated symptoms: no anorexia, no belching, no chest pain, no chills, no constipation, no cough, no diarrhea, no dysuria, no fatigue, no fever, no flatus, no hematemesis, no hematochezia, no hematuria, no melena, no nausea, no shortness of breath, no sore throat, no vaginal bleeding, no vaginal discharge and no vomiting    Risk factors: no alcohol abuse, no aspirin use, not elderly, has not had multiple surgeries, no NSAID use, not obese, not pregnant and no recent hospitalization        Review of Systems   Constitutional: Negative for activity change, appetite change, chills, diaphoresis, fatigue and fever.   HENT: Negative for congestion, ear pain and sore throat.    Eyes: Negative for redness.   Respiratory: Negative for cough, chest tightness, shortness of breath and wheezing.    Cardiovascular: Negative for chest pain, palpitations and leg swelling.   Gastrointestinal: Positive for abdominal pain. Negative for anorexia, constipation, diarrhea, flatus, hematemesis, hematochezia, melena, nausea and vomiting.   Genitourinary: Negative for dysuria, hematuria, urgency, vaginal bleeding and vaginal discharge.   Musculoskeletal: Negative for arthralgias, back pain, myalgias and neck pain.   Skin: Negative for pallor, rash and wound.    Neurological: Negative for dizziness, speech difficulty, weakness and headaches.   Psychiatric/Behavioral: Negative for agitation, behavioral problems, confusion and decreased concentration.   All other systems reviewed and are negative.      Past Medical History:   Diagnosis Date   • Arthritis        No Known Allergies    Past Surgical History:   Procedure Laterality Date   • APPENDECTOMY     • DIAGNOSTIC LAPAROSCOPY N/A 2019    Procedure: DIAGNOSTIC LAPAROSCOPY, LAPAROSCOPIC APPENDECTOMY;  Surgeon: Ehsan Ibrahim MD;  Location: [facility];  Service: General   • KNEE SURGERY Bilateral        Family History   Problem Relation Age of Onset   • Hypertension Mother    • Heart disease Mother    • Diabetes Mother    • Cancer Mother        Social History     Socioeconomic History   • Marital status:    Tobacco Use   • Smoking status: Former Smoker     Types: Cigarettes     Quit date: 2/15/2012     Years since quittin.7   • Smokeless tobacco: Never Used   Vaping Use   • Vaping Use: Every day   • Substances: Nicotine, CBD, Flavoring   • Devices: Refillable tank   Substance and Sexual Activity   • Alcohol use: Yes     Comment: OCCASIONALLY   • Drug use: No   • Sexual activity: Defer           Objective   Physical Exam  Vitals and nursing note reviewed.   Constitutional:       General: He is not in acute distress.     Appearance: Normal appearance. He is well-developed. He is not toxic-appearing or diaphoretic.   HENT:      Head: Normocephalic and atraumatic.      Right Ear: External ear normal.      Left Ear: External ear normal.      Nose: Nose normal.      Mouth/Throat:      Pharynx: No oropharyngeal exudate.      Tonsils: No tonsillar exudate.   Eyes:      General: Lids are normal.      Conjunctiva/sclera: Conjunctivae normal.      Pupils: Pupils are equal, round, and reactive to light.   Neck:      Thyroid: No thyromegaly.   Cardiovascular:      Rate and Rhythm: Normal rate and regular rhythm.       Pulses: Normal pulses.      Heart sounds: Normal heart sounds, S1 normal and S2 normal.   Pulmonary:      Effort: Pulmonary effort is normal. No tachypnea or respiratory distress.      Breath sounds: Normal breath sounds. No decreased breath sounds, wheezing or rales.   Chest:      Chest wall: No tenderness.   Abdominal:      General: Bowel sounds are normal. There is no distension.      Palpations: Abdomen is soft.      Tenderness: There is abdominal tenderness in the right upper quadrant and right lower quadrant. There is no guarding or rebound.   Musculoskeletal:         General: No tenderness or deformity. Normal range of motion.      Cervical back: Full passive range of motion without pain, normal range of motion and neck supple.   Lymphadenopathy:      Cervical: No cervical adenopathy.   Skin:     General: Skin is warm and dry.      Coloration: Skin is not pale.      Findings: No erythema or rash.   Neurological:      Mental Status: He is alert and oriented to person, place, and time.      GCS: GCS eye subscore is 4. GCS verbal subscore is 5. GCS motor subscore is 6.      Cranial Nerves: No cranial nerve deficit.      Sensory: No sensory deficit.   Psychiatric:         Speech: Speech normal.         Behavior: Behavior normal.         Thought Content: Thought content normal.         Judgment: Judgment normal.         Procedures           ED Course  ED Course as of 11/18/21 0732   Mon Nov 15, 2021   0900 ECG 12 Lead  Normal sinus rhythm  Incomplete right bundle branch block  Borderline ECG  When compared with ECG of 18-AUG-2019 18:11,  No significant change was found  Vent. rate 81 BPM  IA interval 128 ms  QRS duration 108 ms  QT/QTcB 388/450 ms  P-R-T axes 71 12 55 [ES]   1037 CT Abdomen Pelvis Without Contrast  IMPRESSION:  1.  Small nonobstructing right kidney stones. No ureteral stones or  obstructive uropathy.  2.  Other nonacute findings as above which appear stable from the  previous exam. [ES]   1037 US  Gallbladder  IMPRESSION:  Unremarkable exam. [ES]   Thu Nov 18, 2021   0732 ECG 12 Lead  Vent. Rate :  81 BPM     Atrial Rate :  81 BPM     P-R Int : 128 ms          QRS Dur : 108 ms      QT Int : 388 ms       P-R-T Axes :  71  12  55 degrees     QTc Int : 450 ms     Normal sinus rhythm  Incomplete right bundle branch block  Borderline ECG  When compared with ECG of 18-AUG-2019 18:11,  No significant change was found [ES]      ED Course User Index  [ES] Trent Carreno MD                                           MDM  Number of Diagnoses or Management Options  Kidney stone: new and requires workup  Right flank pain: new and requires workup  Right sided abdominal pain: new and requires workup     Amount and/or Complexity of Data Reviewed  Clinical lab tests: reviewed and ordered  Tests in the radiology section of CPT®: reviewed and ordered  Tests in the medicine section of CPT®: reviewed and ordered  Review and summarize past medical records: yes  Independent visualization of images, tracings, or specimens: yes    Risk of Complications, Morbidity, and/or Mortality  Presenting problems: moderate  Diagnostic procedures: moderate  Management options: moderate    Patient Progress  Patient progress: stable      Final diagnoses:   Kidney stone   Right sided abdominal pain   Right flank pain       ED Disposition  ED Disposition     ED Disposition Condition Comment    Discharge Stable           Elian Kuhn MD  [address] [id number]  [phone]    Schedule an appointment as soon as possible for a visit in 1 day  EVALUATE    Eric Walton MD  [address] [place] [id number]  [phone]    Schedule an appointment as soon as possible for a visit in 1 day  LOWER AND UPPER SCOPE         Medication List      New Prescriptions    HYDROcodone-acetaminophen 5-325 MG per tablet  Commonly known as: NORCO  Take 1 tablet by mouth Every 6 (Six) Hours As Needed for Severe Pain .     promethazine 25 MG  tablet  Commonly known as: PHENERGAN  Take 1 tablet by mouth Every 6 (Six) Hours As Needed for Nausea or Vomiting.           Where to Get Your Medications      These medications were sent to [facility] [address] [address] [facility] - [address] - [phone]  - [phone]   [address] [place] [address]    Phone: [phone]   · HYDROcodone-acetaminophen 5-325 MG per tablet  · promethazine 25 MG tablet          Trent Carreno MD  11/18/21 0776

## 2022-01-11 ENCOUNTER — TRANSCRIBE ORDERS (OUTPATIENT)
Dept: ADMINISTRATIVE | Facility: HOSPITAL | Age: 52
End: 2022-01-11

## 2022-01-11 DIAGNOSIS — Z01.818 PRE-OPERATIVE CLEARANCE: Primary | ICD-10-CM

## 2022-01-12 ENCOUNTER — LAB (OUTPATIENT)
Dept: LAB | Facility: HOSPITAL | Age: 52
End: 2022-01-12

## 2022-01-12 DIAGNOSIS — Z01.818 PRE-OPERATIVE CLEARANCE: ICD-10-CM

## 2022-01-12 PROCEDURE — U0004 COV-19 TEST NON-CDC HGH THRU: HCPCS | Performed by: INTERNAL MEDICINE

## 2022-01-12 PROCEDURE — C9803 HOPD COVID-19 SPEC COLLECT: HCPCS

## 2022-01-13 LAB — SARS-COV-2 RNA PNL SPEC NAA+PROBE: NOT DETECTED

## 2022-02-17 ENCOUNTER — OFFICE VISIT (OUTPATIENT)
Dept: SURGERY | Facility: CLINIC | Age: 52
End: 2022-02-17

## 2022-02-17 ENCOUNTER — HOSPITAL ENCOUNTER (OUTPATIENT)
Dept: GENERAL RADIOLOGY | Facility: HOSPITAL | Age: 52
Discharge: HOME | End: 2022-02-17
Admitting: SURGERY

## 2022-02-17 VITALS
BODY MASS INDEX: 27.58 KG/M2 | WEIGHT: 182 LBS | HEIGHT: 68 IN | SYSTOLIC BLOOD PRESSURE: 110 MMHG | DIASTOLIC BLOOD PRESSURE: 80 MMHG

## 2022-02-17 DIAGNOSIS — M79.5 FOREIGN BODY (FB) IN SOFT TISSUE: ICD-10-CM

## 2022-02-17 DIAGNOSIS — S60.552A SUPERFICIAL FOREIGN BODY OF LEFT HAND, INITIAL ENCOUNTER: ICD-10-CM

## 2022-02-17 DIAGNOSIS — M79.5 FOREIGN BODY (FB) IN SOFT TISSUE: Primary | ICD-10-CM

## 2022-02-17 PROCEDURE — 73130 X-RAY EXAM OF HAND: CPT | Performed by: RADIOLOGY

## 2022-02-17 PROCEDURE — 73130 X-RAY EXAM OF HAND: CPT

## 2022-02-17 PROCEDURE — 99213 OFFICE O/P EST LOW 20 MIN: CPT | Performed by: SURGERY

## 2022-02-17 RX ORDER — LISINOPRIL 10 MG/1
1 TABLET ORAL DAILY
COMMUNITY
Start: 2022-01-12

## 2022-02-17 NOTE — PROGRESS NOTES
Subjective   Brad Botello is a 52 y.o. male.     Chief Complaint: foreign body left index finger    History of Present Illness  He has a piecd of metal in the left index finger near the distal IP joint a couple of months ago. He said he thought he had dug it out, but had continued swelling and soreness. He had been on antibiotics that ended 4 days ago and it has not been red or draining. No recent xrays.    The following portions of the patient's history were reviewed and updated as appropriate: current medications, past family history, past medical history, past social history, past surgical history and problem list.    Review of Systems    Objective   Physical Exam   He has a healed wound on the distal IP joint of the index finger of the left hand. No drainage.    Past Medical History:   Diagnosis Date   • Arthritis        Family History   Problem Relation Age of Onset   • Hypertension Mother    • Heart disease Mother    • Diabetes Mother    • Cancer Mother        Social History     Tobacco Use   • Smoking status: Former Smoker     Types: Cigarettes     Quit date: 2/15/2012     Years since quitting: 10.0   • Smokeless tobacco: Never Used   Vaping Use   • Vaping Use: Every day   • Substances: Nicotine, CBD, Flavoring   • Devices: SourceMedicalble tank   Substance Use Topics   • Alcohol use: Yes     Comment: OCCASIONALLY   • Drug use: No       Past Surgical History:   Procedure Laterality Date   • APPENDECTOMY     • COLONOSCOPY     • DIAGNOSTIC LAPAROSCOPY N/A 9/30/2019    Procedure: DIAGNOSTIC LAPAROSCOPY, LAPAROSCOPIC APPENDECTOMY;  Surgeon: Ehsan Ibrahim MD;  Location: [facility];  Service: General   • ENDOSCOPY     • KNEE SURGERY Bilateral    • WISDOM TOOTH EXTRACTION         Current Outpatient Medications   Medication Instructions   • HYDROcodone-acetaminophen (NORCO) 5-325 MG per tablet 1 tablet, Oral, Every 6 Hours PRN   • lisinopril (Zestril) 10 MG tablet 1 tablet, Oral, Daily   • meloxicam (Mobic) 15 MG  tablet take 1 tablet by oral route  every day with food   • methocarbamol (ROBAXIN) 500 mg, Oral, Every 8 Hours PRN   • promethazine (PHENERGAN) 25 mg, Oral, Every 6 Hours PRN         Assessment/Plan   Diagnoses and all orders for this visit:    1. Foreign body (FB) in soft tissue (Primary)  -     XR Hand 3+ View Left; Future    2. Superficial foreign body of left hand, initial encounter    check x ray for possible remaining material and see if it flares up again. If it does not, it should resolve on its own.

## 2023-04-21 NOTE — ED NOTES
Patient removed from backboard at this time. C-collar remains in place with c-spine maintained during removal of backboard. Tolerated well. Will continue to monitor and follow plan of care.      Layla Lobo RN  08/18/19 2788     Statement Selected

## 2023-05-31 VITALS
BODY MASS INDEX: 23.62 KG/M2 | TEMPERATURE: 98.2 F | HEART RATE: 85 BPM | HEIGHT: 70 IN | OXYGEN SATURATION: 97 % | WEIGHT: 165 LBS | RESPIRATION RATE: 18 BRPM | SYSTOLIC BLOOD PRESSURE: 138 MMHG | DIASTOLIC BLOOD PRESSURE: 87 MMHG

## 2023-05-31 PROCEDURE — 99282 EMERGENCY DEPT VISIT SF MDM: CPT

## 2023-06-01 ENCOUNTER — HOSPITAL ENCOUNTER (EMERGENCY)
Facility: HOSPITAL | Age: 53
Discharge: HOME | End: 2023-06-01
Attending: STUDENT IN AN ORGANIZED HEALTH CARE EDUCATION/TRAINING PROGRAM
Payer: COMMERCIAL

## 2023-06-01 DIAGNOSIS — T15.91XA FOREIGN BODY OF RIGHT EYE, INITIAL ENCOUNTER: ICD-10-CM

## 2023-06-01 DIAGNOSIS — H57.11 ACUTE RIGHT EYE PAIN: Primary | ICD-10-CM

## 2023-06-01 RX ORDER — TETRACAINE HYDROCHLORIDE 5 MG/ML
2 SOLUTION OPHTHALMIC ONCE
Status: DISCONTINUED | OUTPATIENT
Start: 2023-06-01 | End: 2023-06-01 | Stop reason: HOSPADM

## 2023-06-01 RX ORDER — PURIFIED WATER 986 MG/ML
SOLUTION OPHTHALMIC ONCE AS NEEDED
Status: DISCONTINUED | OUTPATIENT
Start: 2023-06-01 | End: 2023-06-01 | Stop reason: HOSPADM

## 2023-06-01 NOTE — ED PROVIDER NOTES
Subjective   History of Present Illness  53-year-old male presents with complaints of concerns for possible foreign body in his right eye.  He was utilizing his wood saw at home and states that wood flew back into his eyes and there is a speck of dirt on the upper side of the eyelid that he is try to remove at home but cannot.  He denies pain with eye movement and there is no changes in vision at this time.  He does report increased tearing.        Review of Systems    Past Medical History:   Diagnosis Date   • Arthritis        No Known Allergies    Past Surgical History:   Procedure Laterality Date   • APPENDECTOMY     • COLONOSCOPY     • DIAGNOSTIC LAPAROSCOPY N/A 2019    Procedure: DIAGNOSTIC LAPAROSCOPY, LAPAROSCOPIC APPENDECTOMY;  Surgeon: Ehsan Ibrahim MD;  Location: [facility];  Service: General   • ENDOSCOPY     • KNEE SURGERY Bilateral    • WISDOM TOOTH EXTRACTION         Family History   Problem Relation Age of Onset   • Hypertension Mother    • Heart disease Mother    • Diabetes Mother    • Cancer Mother        Social History     Socioeconomic History   • Marital status:    Tobacco Use   • Smoking status: Former     Types: Cigarettes     Quit date: 2/15/2012     Years since quittin.2   • Smokeless tobacco: Never   Vaping Use   • Vaping Use: Every day   • Substances: Nicotine, CBD, Flavoring   • Devices: Refillable tank   Substance and Sexual Activity   • Alcohol use: Yes     Comment: OCCASIONALLY   • Drug use: No   • Sexual activity: Defer           Objective   Physical Exam  Vitals and nursing note reviewed.   Constitutional:       General: He is not in acute distress.     Appearance: He is well-developed. He is not diaphoretic.   HENT:      Head: Normocephalic and atraumatic.      Right Ear: External ear normal.      Left Ear: External ear normal.      Nose: Nose normal.   Eyes:      Extraocular Movements: Extraocular movements intact.      Pupils: Pupils are equal, round, and  reactive to light.      Comments: Right eye is slightly injected, there is a very small speck of dirt that can easily be seen on the upper undersurface of the eyelid when manually retracted.       Neck:      Vascular: No JVD.      Trachea: No tracheal deviation.   Cardiovascular:      Rate and Rhythm: Normal rate and regular rhythm.      Heart sounds: Normal heart sounds. No murmur heard.  Pulmonary:      Effort: Pulmonary effort is normal. No respiratory distress.      Breath sounds: Normal breath sounds. No wheezing.   Abdominal:      General: Bowel sounds are normal.      Palpations: Abdomen is soft.      Tenderness: There is no abdominal tenderness.   Musculoskeletal:         General: No deformity. Normal range of motion.      Cervical back: Normal range of motion and neck supple.   Skin:     General: Skin is warm and dry.      Coloration: Skin is not pale.      Findings: No erythema or rash.   Neurological:      Mental Status: He is alert and oriented to person, place, and time.      Cranial Nerves: No cranial nerve deficit.   Psychiatric:         Behavior: Behavior normal.         Thought Content: Thought content normal.         Foreign Body Removal - Ocular    Date/Time: 6/1/2023 1:32 AM  Performed by: Veronica Daniel DO  Authorized by: Veronica Daniel DO     Consent:     Consent obtained:  Verbal    Consent given by:  Patient  Universal protocol:     Patient identity confirmed:  Verbally with patient  Location:     Location:  R upper eyelid    Depth:  Superficial  Procedure details:     Localization method:  Direct visualization    Removal mechanism:  Sterile cotton-tip applicator    Foreign bodies recovered:  1    Intact foreign body removal: yes    Post-procedure details:     Procedure completion:  Tolerated well, no immediate complications            ED Course      Medical work-up  -Small speck of dirt/wood was easily removed with manual sterile cotton tip applicator.  Speck of dirt measured roughly 1  mm    Upon removal of the foreign body patient states that all pain and irritation in the right was resolved.  -Patient declined visual examination under fluorescein and Woods lamp at bedside.  Patient was recommended to irrigate the eye at home with regular eyedrops                                           MDM    Final diagnoses:   Acute right eye pain   Foreign body of right eye, initial encounter       ED Disposition  ED Disposition     ED Disposition   Discharge    Condition   Stable    Comment   --             Elian Kuhn MD  [address] [place], [place] [id number] [phone]               Medication List      No changes were made to your prescriptions during this visit.          Veronica Daniel DO  06/01/23 0135

## 2023-06-01 NOTE — Clinical Note
[facility] EMERGENCY DEPARTMENT  [address] [facility] [address]  Phone: [phone]    Brad Botello was seen and treated in our emergency department on 5/31/2023.  He may return to work on 06/01/2023.  Patient can return to work on 6/2/2023.       Thank you for choosing [facility].    Veronica Daniel DO

## 2025-05-16 ENCOUNTER — TRANSCRIBE ORDERS (OUTPATIENT)
Dept: ADMINISTRATIVE | Facility: HOSPITAL | Age: 55
End: 2025-05-16
Payer: COMMERCIAL

## 2025-05-16 DIAGNOSIS — Z87.891 PERSONAL HISTORY OF TOBACCO USE, PRESENTING HAZARDS TO HEALTH: Primary | ICD-10-CM

## 2025-05-23 ENCOUNTER — HOSPITAL ENCOUNTER (OUTPATIENT)
Facility: HOSPITAL | Age: 55
Discharge: HOME | End: 2025-05-23
Admitting: NURSE PRACTITIONER
Payer: COMMERCIAL

## 2025-05-23 DIAGNOSIS — Z87.891 PERSONAL HISTORY OF TOBACCO USE, PRESENTING HAZARDS TO HEALTH: ICD-10-CM

## 2025-05-23 PROCEDURE — 71271 CT THORAX LUNG CANCER SCR C-: CPT

## 2025-06-05 ENCOUNTER — OFFICE VISIT (OUTPATIENT)
Dept: PULMONOLOGY | Facility: CLINIC | Age: 55
End: 2025-06-05
Payer: COMMERCIAL

## 2025-06-05 ENCOUNTER — TELEPHONE (OUTPATIENT)
Dept: PULMONOLOGY | Facility: CLINIC | Age: 55
End: 2025-06-05
Payer: COMMERCIAL

## 2025-06-05 VITALS
HEART RATE: 101 BPM | OXYGEN SATURATION: 98 % | BODY MASS INDEX: 24.05 KG/M2 | SYSTOLIC BLOOD PRESSURE: 130 MMHG | WEIGHT: 168 LBS | HEIGHT: 70 IN | DIASTOLIC BLOOD PRESSURE: 88 MMHG

## 2025-06-05 DIAGNOSIS — Z87.891 FORMER SMOKER: ICD-10-CM

## 2025-06-05 DIAGNOSIS — R91.8 MULTIPLE PULMONARY NODULES: Primary | ICD-10-CM

## 2025-06-05 PROCEDURE — 99204 OFFICE O/P NEW MOD 45 MIN: CPT | Performed by: INTERNAL MEDICINE

## 2025-06-05 NOTE — PROGRESS NOTES
PULMONARY  NOTE    Chief Complaint     Pulmonary nodules, former smoker    History of Present Illness     55-year-old male referred for abnormal chest imaging    Former smoker with a greater than 30-pack-year smoking history  Stopped smoking in 2012  However, uses a nicotine vaping system    He denies a history of chronic lung disease  He is on no respiratory medications    No regular cough or sputum production  He has not had hemoptysis  No dyspnea on exertion    He underwent what is his baseline LDCT last month  Findings are as noted below  To his recollection he has not had a CT scan of his chest in the past    Patient Active Problem List   Diagnosis    Superficial foreign body of left hand    Former smoker (Stopped 2013)    Multiple pulmonary nodules (Max 9mm RUL)      No Known Allergies    Current Outpatient Medications:     HYDROcodone-acetaminophen (NORCO) 5-325 MG per tablet, Take 1 tablet by mouth Every 6 (Six) Hours As Needed for Severe Pain . (Patient not taking: Reported on 6/5/2025), Disp: 10 tablet, Rfl: 0    lisinopril (Zestril) 10 MG tablet, Take 1 tablet by mouth Daily. (Patient not taking: Reported on 6/5/2025), Disp: , Rfl:     meloxicam (Mobic) 15 MG tablet, take 1 tablet by oral route  every day with food (Patient not taking: Reported on 6/5/2025), Disp: , Rfl:     methocarbamol (Robaxin) 500 MG tablet, Take 1 tablet by mouth Every 8 (Eight) Hours As Needed for Muscle Spasms. (Patient not taking: Reported on 6/5/2025), Disp: 60 tablet, Rfl: 1    promethazine (PHENERGAN) 25 MG tablet, Take 1 tablet by mouth Every 6 (Six) Hours As Needed for Nausea or Vomiting. (Patient not taking: Reported on 6/5/2025), Disp: 30 tablet, Rfl: 0  MEDICATION LIST AND ALLERGIES REVIEWED.    Family History   Problem Relation Age of Onset    Hypertension Mother     Heart disease Mother     Diabetes Mother     Cancer Mother         Breast     Social History     Tobacco Use    Smoking status: Former     Current  "packs/day: 0.00     Average packs/day: 2.0 packs/day for 15.1 years (30.2 ttl pk-yrs)     Types: Cigarettes     Start date: 1997     Quit date: 2/15/2012     Years since quittin.3    Smokeless tobacco: Never    Tobacco comments:     Currently Vap   Vaping Use    Vaping status: Every Day    Start date: 2012    Substances: Nicotine, CBD, Flavoring    Devices: Refillable tank   Substance Use Topics    Alcohol use: Not Currently     Comment: OCCASIONALLY    Drug use: No     Social History     Social History Narrative    Has not smoked since roughly     Uses an nicotine vaping product     FAMILY AND SOCIAL HISTORY REVIEWED.    Review of Systems  IF PRESENT REFER TO SCANNED ROS SHEET FROM SAME DATE  OTHERWISE ROS OBTAINED AND NON-CONTRIBUTORY OVER HPI.    /88   Pulse 101   Ht 177.8 cm (70\")   Wt 76.2 kg (168 lb)   SpO2 98%   BMI 24.11 kg/m²   Physical Exam  Vitals and nursing note reviewed.   Constitutional:       General: He is not in acute distress.     Appearance: He is well-developed. He is not diaphoretic.   HENT:      Head: Normocephalic and atraumatic.   Neck:      Thyroid: No thyromegaly.   Cardiovascular:      Rate and Rhythm: Normal rate and regular rhythm.      Heart sounds: Normal heart sounds. No murmur heard.  Pulmonary:      Effort: Pulmonary effort is normal.      Breath sounds: Normal breath sounds. No stridor.   Lymphadenopathy:      Cervical: No cervical adenopathy.      Upper Body:      Right upper body: No supraclavicular or epitrochlear adenopathy.      Left upper body: No supraclavicular or epitrochlear adenopathy.   Skin:     General: Skin is warm and dry.   Neurological:      Mental Status: He is alert and oriented to person, place, and time.   Psychiatric:         Behavior: Behavior normal.         Results     Low-dose CT scan of the chest reviewed on PACS  Several pulmonary nodules the largest of which is in the right upper lobe posterior with no old imaging studies " available for comparison    Immunization History   Administered Date(s) Administered    COVID-19 (PFIZER) Purple Cap Monovalent 03/22/2021, 04/13/2021    Covid-19 (Pfizer) Gray Cap Monovalent 01/28/2022    flucelvax quad pfs =>4 YRS 09/30/2019     Problem List       ICD-10-CM ICD-9-CM   1. Multiple pulmonary nodules (Max 9mm RUL)  R91.8 793.19   2. Former smoker (Stopped 2013)  Z87.891 V15.82       Discussion     We reviewed his chest imaging together on PACS  Unfortunately no old films available for comparison    This represents a lung RADS category 4A with a 9 mm nodule on at baseline LDCT  I have recommended a 3-month follow-up CT scan of the chest  Will do that as a robotic protocol and if there is any interval enlargement then we would consider navigational bronchoscopy  However, this nodule has characteristics suggestive of a postinflammatory scar and I think that it is unlikely that we will see any change over time    I gave him a list of FDA authorized e-cigarettes and suggested that he avoid other nicotine vaping systems    I will see him back with his follow-up CT scan at the end of August    This visit represents a new patient relationship with whom the provider will be providing ongoing longitudinal care related to serious and/or complex conditions    Moderate level of Medical Decision Making complexity based on 1 undiagnosed new problem or 2 stable chronic conditions, independent interpretation of tests, and/or prescription drug management     Adama Murphy MD  Note electronically signed    CC: Elian Kuhn MD

## 2025-06-05 NOTE — TELEPHONE ENCOUNTER
Pt would like to be scheduled for a f/u appt w/ [facility] in [place]. Pt was given phone number to call and schedule appt, pt educated on CT clearance then scheduling. - mw 6/5

## 2025-06-06 DIAGNOSIS — R91.8 MULTIPLE PULMONARY NODULES: Primary | ICD-10-CM

## 2025-06-19 ENCOUNTER — OFFICE VISIT (OUTPATIENT)
Dept: CARDIOLOGY | Facility: CLINIC | Age: 55
End: 2025-06-19
Payer: COMMERCIAL

## 2025-06-19 VITALS
SYSTOLIC BLOOD PRESSURE: 109 MMHG | RESPIRATION RATE: 18 BRPM | BODY MASS INDEX: 23.37 KG/M2 | OXYGEN SATURATION: 98 % | HEART RATE: 78 BPM | WEIGHT: 163.2 LBS | DIASTOLIC BLOOD PRESSURE: 69 MMHG | HEIGHT: 70 IN

## 2025-06-19 DIAGNOSIS — F17.290 VAPING NICOTINE DEPENDENCE, TOBACCO PRODUCT: ICD-10-CM

## 2025-06-19 DIAGNOSIS — I10 ESSENTIAL HYPERTENSION: ICD-10-CM

## 2025-06-19 DIAGNOSIS — F17.211 CIGARETTE NICOTINE DEPENDENCE IN REMISSION: ICD-10-CM

## 2025-06-19 DIAGNOSIS — I25.10 CORONARY ARTERY CALCIFICATION: Primary | ICD-10-CM

## 2025-06-19 DIAGNOSIS — R06.02 SHORTNESS OF BREATH: ICD-10-CM

## 2025-06-19 DIAGNOSIS — R07.2 PRECORDIAL CHEST PAIN: ICD-10-CM

## 2025-06-19 DIAGNOSIS — G47.33 OSA (OBSTRUCTIVE SLEEP APNEA): ICD-10-CM

## 2025-06-19 PROBLEM — Z87.891 FORMER SMOKER: Status: RESOLVED | Noted: 2025-06-05 | Resolved: 2025-06-19

## 2025-06-19 PROCEDURE — 99204 OFFICE O/P NEW MOD 45 MIN: CPT | Performed by: SPECIALIST

## 2025-06-19 PROCEDURE — 93000 ELECTROCARDIOGRAM COMPLETE: CPT | Performed by: SPECIALIST

## 2025-06-19 RX ORDER — NITROGLYCERIN 0.4 MG/1
0.4 TABLET SUBLINGUAL
OUTPATIENT
Start: 2025-06-19 | End: 2025-06-19

## 2025-06-19 RX ORDER — PANTOPRAZOLE SODIUM 20 MG/1
20 TABLET, DELAYED RELEASE ORAL DAILY
COMMUNITY

## 2025-06-19 RX ORDER — METOPROLOL TARTRATE 50 MG
50 TABLET ORAL ONCE
Qty: 1 TABLET | Refills: 0 | Status: SHIPPED | OUTPATIENT
Start: 2025-06-19 | End: 2025-06-19

## 2025-06-19 RX ORDER — ROSUVASTATIN CALCIUM 10 MG/1
10 TABLET, COATED ORAL DAILY
Qty: 90 TABLET | Refills: 3 | Status: SHIPPED | OUTPATIENT
Start: 2025-06-19

## 2025-06-19 RX ORDER — SODIUM CHLORIDE 0.9 % (FLUSH) 0.9 %
10 SYRINGE (ML) INJECTION EVERY 12 HOURS SCHEDULED
OUTPATIENT
Start: 2025-06-19

## 2025-06-19 RX ORDER — ASPIRIN 81 MG/1
81 TABLET ORAL DAILY
Qty: 90 TABLET | Refills: 1 | Status: SHIPPED | OUTPATIENT
Start: 2025-06-19

## 2025-06-19 RX ORDER — SODIUM CHLORIDE 0.9 % (FLUSH) 0.9 %
10 SYRINGE (ML) INJECTION AS NEEDED
OUTPATIENT
Start: 2025-06-19

## 2025-06-19 RX ORDER — SODIUM CHLORIDE 9 MG/ML
40 INJECTION, SOLUTION INTRAVENOUS AS NEEDED
OUTPATIENT
Start: 2025-06-19

## 2025-06-19 NOTE — PROGRESS NOTES
Subjective   Initial consultation, coronary calcification on CT chest, chest pain  Brad Botello is a 55 y.o. male who presents to day for Establish Care (New patient - Patient referred due to seeing blockages on LDCT. Patient states he does have occasional chest pains, palpitations, shortness of breath, and leg swelling. ), Chest Pain, Palpitations, Shortness of Breath, and Leg Swelling.    CHIEF COMPLIANT  Chief Complaint   Patient presents with    Establish Care     New patient - Patient referred due to seeing blockages on LDCT. Patient states he does have occasional chest pains, palpitations, shortness of breath, and leg swelling.     Chest Pain    Palpitations    Shortness of Breath    Leg Swelling       Active Problems:  Problem List Items Addressed This Visit          Cardiac and Vasculature    Coronary artery calcification - Primary    Relevant Medications    aspirin 81 MG EC tablet    rosuvastatin (CRESTOR) 10 MG tablet    metoprolol tartrate (LOPRESSOR) 50 MG tablet    Other Relevant Orders    CT Angiogram Coronary    No Caffeine or Nicotine 4 Hours Prior to CTA Appointment    Nothing to Eat or Drink 4 Hours Prior to CTA Appointment    Do Not Take Phosphodiasterase Inhibitors in the 72 Hours Prior to Coronary CTA    Adult Transthoracic Echo Complete W/ Cont if Necessary Per Protocol    Precordial chest pain    Relevant Medications    aspirin 81 MG EC tablet    rosuvastatin (CRESTOR) 10 MG tablet    metoprolol tartrate (LOPRESSOR) 50 MG tablet    Other Relevant Orders    CT Angiogram Coronary    No Caffeine or Nicotine 4 Hours Prior to CTA Appointment    Nothing to Eat or Drink 4 Hours Prior to CTA Appointment    Do Not Take Phosphodiasterase Inhibitors in the 72 Hours Prior to Coronary CTA    Adult Transthoracic Echo Complete W/ Cont if Necessary Per Protocol    Essential hypertension    Relevant Medications    metoprolol tartrate (LOPRESSOR) 50 MG tablet       Pulmonary and Pneumonias    Shortness of  breath    Relevant Orders    Adult Transthoracic Echo Complete W/ Cont if Necessary Per Protocol       Sleep    DANI (obstructive sleep apnea)    Relevant Orders    Home Sleep Study       Tobacco    Cigarette nicotine dependence in remission       Other    Vaping nicotine dependence, tobacco product       HPI  Chest Pain   Associated symptoms include palpitations and shortness of breath. Pertinent negatives include no cough.   Palpitations   Associated symptoms include chest pain and shortness of breath. Pertinent negatives include no coughing.   Shortness of Breath  Associated symptoms: chest pain/tightness    Associated symptoms: no leg swelling and no wheezing    Leg Swelling  Symptoms include chest pain.    Pertinent negative symptoms include no cough.       History of Present Illness    After having a routine chest x-ray for lung nodules he was found to have moderate coronary calcification and for this reason he was referred, he said that he gets dyspneic after walking roughly about a block but about once a week or so for the last 5 years he gets some chest discomfort which can last maybe a minute or less sometimes he will continue walking and will sometimes you stop no significant edema but he feels his heart sometimes racing when he walks fast he gets lightheaded standing up from sitting position, his blood pressure has been poorly controlled at home and no history of diabetes and his cholesterol was done recently but we do not know the exact result, he used to smoke for almost 22 years for about 2 and half packs per day he quit it back in 2012 and now he is vaping no history of diabetes family history his father had a heart attack about age 55  He states that he feels extremely fatigued and he is very sleepy all day long and he snores at night and also during the night he will quit breathing sometimes  PRIOR MEDS  Current Outpatient Medications on File Prior to Visit   Medication Sig Dispense Refill     pantoprazole (PROTONIX) 20 MG EC tablet Take 1 tablet by mouth Daily.      [DISCONTINUED] HYDROcodone-acetaminophen (NORCO) 5-325 MG per tablet Take 1 tablet by mouth Every 6 (Six) Hours As Needed for Severe Pain . (Patient not taking: Reported on 2025) 10 tablet 0    [DISCONTINUED] lisinopril (Zestril) 10 MG tablet Take 1 tablet by mouth Daily. (Patient not taking: Reported on 2025)      [DISCONTINUED] meloxicam (Mobic) 15 MG tablet take 1 tablet by oral route  every day with food (Patient not taking: Reported on 2025)      [DISCONTINUED] methocarbamol (Robaxin) 500 MG tablet Take 1 tablet by mouth Every 8 (Eight) Hours As Needed for Muscle Spasms. (Patient not taking: Reported on 2025) 60 tablet 1    [DISCONTINUED] promethazine (PHENERGAN) 25 MG tablet Take 1 tablet by mouth Every 6 (Six) Hours As Needed for Nausea or Vomiting. (Patient not taking: Reported on 2025) 30 tablet 0     No current facility-administered medications on file prior to visit.       ALLERGIES  Patient has no known allergies.    HISTORY  Past Medical History:   Diagnosis Date    Arthritis     Pneumonia     Rheumatoid arthritis     Sleep apnea, obstructive        Social History     Socioeconomic History    Marital status:    Tobacco Use    Smoking status: Former     Current packs/day: 0.00     Average packs/day: 2.0 packs/day for 15.1 years (30.2 ttl pk-yrs)     Types: Cigarettes     Start date: 1997     Quit date: 2/15/2012     Years since quittin.3    Smokeless tobacco: Never    Tobacco comments:     Currently Vap   Vaping Use    Vaping status: Every Day    Start date: 2012    Substances: Nicotine, CBD, Flavoring    Devices: Refillable tank   Substance and Sexual Activity    Alcohol use: Not Currently     Comment: OCCASIONALLY    Drug use: No    Sexual activity: Yes     Partners: Female     Birth control/protection: None       Family History   Problem Relation Age of Onset    Hypertension  "Mother     Heart disease Mother     Diabetes Mother     Cancer Mother         Breast       Review of Systems   Respiratory:  Positive for chest tightness and shortness of breath. Negative for apnea, cough, choking, wheezing and stridor.    Cardiovascular:  Positive for chest pain and palpitations. Negative for leg swelling.       Objective     VITALS: /69 (BP Location: Left arm, Patient Position: Sitting, Cuff Size: Adult)   Pulse 78   Resp 18   Ht 177.8 cm (70\")   Wt 74 kg (163 lb 3.2 oz)   SpO2 98%   BMI 23.42 kg/m²     LABS:   Lab Results (most recent)       None            IMAGING:   CT Chest Low Dose Cancer Screening WO  Result Date: 5/27/2025  1.  Biapical nodular pleural fibrosis noted. 2.  8.9 mm nodule right upper lobe, image #28. 3.  4.5 mm nodule left upper lobe, image #37. 4.  7 mm nodule left upper lobe, image #20. 5.  Moderate coronary artery calcifications.  ASSESSMENT:   Lung-RADS score: 4A - Suspicious.  Recommend low-dose CT (LDCT) in 3 months or PET/CT for solid components 8 mm or larger in size.  This report was finalized on 5/27/2025 8:28 AM by Dr. David Rubio MD.        EXAM:  Physical Exam  Constitutional:       Appearance: He is well-developed.   HENT:      Head: Normocephalic and atraumatic.   Eyes:      Pupils: Pupils are equal, round, and reactive to light.   Neck:      Thyroid: No thyromegaly.      Vascular: No JVD.   Cardiovascular:      Rate and Rhythm: Normal rate and regular rhythm.      Chest Wall: PMI is not displaced.      Pulses: Normal pulses.      Heart sounds: Normal heart sounds, S1 normal and S2 normal. No murmur heard.     No friction rub. No gallop.   Pulmonary:      Effort: Pulmonary effort is normal. No respiratory distress.      Breath sounds: Normal breath sounds. No stridor. No wheezing or rales.   Chest:      Chest wall: No tenderness.   Abdominal:      General: Bowel sounds are normal. There is no distension.      Palpations: Abdomen is soft. There is " no mass.      Tenderness: There is no abdominal tenderness. There is no guarding or rebound.   Musculoskeletal:      Cervical back: Neck supple. No edema.   Skin:     General: Skin is warm and dry.      Coloration: Skin is not pale.      Findings: No erythema or rash.   Neurological:      Mental Status: He is alert and oriented to person, place, and time.      Cranial Nerves: No cranial nerve deficit.      Coordination: Coordination normal.   Psychiatric:         Behavior: Behavior normal.       Physical Exam         Procedure     ECG 12 Lead    Date/Time: 6/19/2025 11:08 AM  Performed by: Rosamaria Harris MD    Authorized by: Rosamaria Harris MD      EKG: Normal sinus rhythm with RSR pattern, otherwise unremarkable EKG compared with EKG on 11/15/2021 no significant change     Results         Assessment & Plan     Diagnoses and all orders for this visit:    1. Coronary artery calcification (Primary)  -     aspirin 81 MG EC tablet; Take 1 tablet by mouth Daily.  Dispense: 90 tablet; Refill: 1  -     rosuvastatin (CRESTOR) 10 MG tablet; Take 1 tablet by mouth Daily.  Dispense: 90 tablet; Refill: 3  -     CT Angiogram Coronary; Future  -     nitroglycerin (NITROSTAT) SL tablet 0.4 mg  -     No Caffeine or Nicotine 4 Hours Prior to CTA Appointment  -     Nothing to Eat or Drink 4 Hours Prior to CTA Appointment  -     Do Not Take Phosphodiasterase Inhibitors in the 72 Hours Prior to Coronary CTA  -     Obtain Informed Consent - Computed Tomography Angiography of Chest - Angiogram of Coronary Arteries; Standing  -     Vital Signs Upon Arrival; Standing  -     Cardiac Monitoring; Standing  -     Verify NPO Status - Patient to Be NPO at Least 4 Hours Prior to CTA; Standing  -     Notify CT After Administration of metoprolol tartrate (LOPRESSOR) tablet; Standing  -     Notify Provider If Total Metoprolol Given Equals 300mg & Heart Rate Not At Goal; Standing  -     Notify Provider Prior to Administration of Nitroglycerin if  Patient SBP <80; Standing  -     POC Creatinine; Standing  -     Insert Peripheral IV; Standing  -     Saline Lock & Maintain IV Access; Standing  -     sodium chloride 0.9 % flush 10 mL  -     sodium chloride 0.9 % flush 10 mL  -     sodium chloride 0.9 % infusion 40 mL  -     Vital Signs - See Instructions; Standing  -     Hold Medication Metformin (Glucophage, Glucophage XR, Fortament, Glumetza);  Metglip (metformin/glipizide);  Glucovance (metformin/glyburide); Avandamet (metformin/rosiglitazone); Standing  -     Patient May Discharge Home After Procedure Complete (If Stable); Standing  -     metoprolol tartrate (LOPRESSOR) 50 MG tablet; Take 1 tablet by mouth 1 (One) Time for 1 dose. Take 50 mg One (1) Hour Prior to Coronary CTA  Dispense: 1 tablet; Refill: 0  -     Adult Transthoracic Echo Complete W/ Cont if Necessary Per Protocol; Future    2. Precordial chest pain  -     aspirin 81 MG EC tablet; Take 1 tablet by mouth Daily.  Dispense: 90 tablet; Refill: 1  -     rosuvastatin (CRESTOR) 10 MG tablet; Take 1 tablet by mouth Daily.  Dispense: 90 tablet; Refill: 3  -     CT Angiogram Coronary; Future  -     nitroglycerin (NITROSTAT) SL tablet 0.4 mg  -     No Caffeine or Nicotine 4 Hours Prior to CTA Appointment  -     Nothing to Eat or Drink 4 Hours Prior to CTA Appointment  -     Do Not Take Phosphodiasterase Inhibitors in the 72 Hours Prior to Coronary CTA  -     Obtain Informed Consent - Computed Tomography Angiography of Chest - Angiogram of Coronary Arteries; Standing  -     Vital Signs Upon Arrival; Standing  -     Cardiac Monitoring; Standing  -     Verify NPO Status - Patient to Be NPO at Least 4 Hours Prior to CTA; Standing  -     Notify CT After Administration of metoprolol tartrate (LOPRESSOR) tablet; Standing  -     Notify Provider If Total Metoprolol Given Equals 300mg & Heart Rate Not At Goal; Standing  -     Notify Provider Prior to Administration of Nitroglycerin if Patient SBP <80; Standing  -      POC Creatinine; Standing  -     Insert Peripheral IV; Standing  -     Saline Lock & Maintain IV Access; Standing  -     sodium chloride 0.9 % flush 10 mL  -     sodium chloride 0.9 % flush 10 mL  -     sodium chloride 0.9 % infusion 40 mL  -     Vital Signs - See Instructions; Standing  -     Hold Medication Metformin (Glucophage, Glucophage XR, Fortament, Glumetza);  Metglip (metformin/glipizide);  Glucovance (metformin/glyburide); Avandamet (metformin/rosiglitazone); Standing  -     Patient May Discharge Home After Procedure Complete (If Stable); Standing  -     metoprolol tartrate (LOPRESSOR) 50 MG tablet; Take 1 tablet by mouth 1 (One) Time for 1 dose. Take 50 mg One (1) Hour Prior to Coronary CTA  Dispense: 1 tablet; Refill: 0  -     Adult Transthoracic Echo Complete W/ Cont if Necessary Per Protocol; Future    3. Shortness of breath  -     Adult Transthoracic Echo Complete W/ Cont if Necessary Per Protocol; Future    4. Essential hypertension    5. Cigarette nicotine dependence in remission    6. Vaping nicotine dependence, tobacco product    7. DANI (obstructive sleep apnea)  -     Home Sleep Study; Future    Other orders  -     ECG 12 Lead    1.  Patient is a CT scan with moderate calcification, he does have symptoms of chest pain with exertion so we will going to go ahead and proceed with CT angiogram of the coronaries for assessment of coronary stenosis, advised him to take an aspirin 81 mg/day  2.  Will get an echocardiogram to assess cardiac function wall motion and valve morphology  3.  Currently his blood pressure is well-controlled with the monitor  4.  Unfortunately, to have lipid profile I am going to start him on rosuvastatin 10 mg/day and will get lipid and adjust the dose according to the requirement  5.  His symptoms are consistent with sleep apnea we will refer him for home sleep study  6.  He quit his smoking back in 2012 advised him strongly also to quit vaping  Assessment & Plan          Return in about 4 weeks (around 7/17/2025).                 MEDS ORDERED DURING VISIT:  New Medications Ordered This Visit   Medications    aspirin 81 MG EC tablet     Sig: Take 1 tablet by mouth Daily.     Dispense:  90 tablet     Refill:  1    rosuvastatin (CRESTOR) 10 MG tablet     Sig: Take 1 tablet by mouth Daily.     Dispense:  90 tablet     Refill:  3    metoprolol tartrate (LOPRESSOR) 50 MG tablet     Sig: Take 1 tablet by mouth 1 (One) Time for 1 dose. Take 50 mg One (1) Hour Prior to Coronary CTA     Dispense:  1 tablet     Refill:  0         As always, Ariana Canas APRN  I appreciate very much the opportunity to participate in the cardiovascular care of your patients. Please do not hesitate to call me with any questions with regards to Brad Botello evaluation and management.           This document has been electronically signed by Rosamaria Harris MD  June 19, 2025 11:57 EDT    This note is dictated utilizing voice recognition software.

## 2025-08-01 ENCOUNTER — HOSPITAL ENCOUNTER (OUTPATIENT)
Dept: CT IMAGING | Facility: HOSPITAL | Age: 55
Discharge: HOME | End: 2025-08-01
Payer: COMMERCIAL

## 2025-08-01 ENCOUNTER — HOSPITAL ENCOUNTER (OUTPATIENT)
Dept: CARDIOLOGY | Facility: HOSPITAL | Age: 55
Discharge: HOME | End: 2025-08-01
Payer: COMMERCIAL

## 2025-08-01 VITALS — DIASTOLIC BLOOD PRESSURE: 80 MMHG | SYSTOLIC BLOOD PRESSURE: 116 MMHG | HEART RATE: 57 BPM

## 2025-08-01 DIAGNOSIS — R06.02 SHORTNESS OF BREATH: ICD-10-CM

## 2025-08-01 DIAGNOSIS — I25.10 CORONARY ARTERY CALCIFICATION: ICD-10-CM

## 2025-08-01 DIAGNOSIS — R07.2 PRECORDIAL CHEST PAIN: ICD-10-CM

## 2025-08-01 LAB
AORTIC DIMENSIONLESS INDEX: 0.81 (DI)
AV MEAN PRESS GRAD SYS DOP V1V2: 2 MMHG
AV VMAX SYS DOP: 86.9 CM/SEC
BH CV ECHO MEAS - ACS: 1.6 CM
BH CV ECHO MEAS - AO MAX PG: 3 MMHG
BH CV ECHO MEAS - AO ROOT DIAM: 3.4 CM
BH CV ECHO MEAS - AO V2 VTI: 18.4 CM
BH CV ECHO MEAS - AVA(I,D): 2.5 CM2
BH CV ECHO MEAS - EDV(CUBED): 157.5 ML
BH CV ECHO MEAS - EDV(MOD-SP2): 82.5 ML
BH CV ECHO MEAS - EDV(MOD-SP4): 60 ML
BH CV ECHO MEAS - EF(MOD-SP2): 55.9 %
BH CV ECHO MEAS - EF(MOD-SP4): 49 %
BH CV ECHO MEAS - ESV(CUBED): 35.9 ML
BH CV ECHO MEAS - ESV(MOD-SP2): 36.4 ML
BH CV ECHO MEAS - ESV(MOD-SP4): 30.6 ML
BH CV ECHO MEAS - FS: 38.9 %
BH CV ECHO MEAS - IVS/LVPW: 1.14 CM
BH CV ECHO MEAS - IVSD: 0.8 CM
BH CV ECHO MEAS - LA DIMENSION: 3 CM
BH CV ECHO MEAS - LAT PEAK E' VEL: 8.9 CM/SEC
BH CV ECHO MEAS - LV DIASTOLIC VOL/BSA (35-75): 31.4 CM2
BH CV ECHO MEAS - LV MASS(C)D: 142.9 GRAMS
BH CV ECHO MEAS - LV MAX PG: 1.7 MMHG
BH CV ECHO MEAS - LV MEAN PG: 1 MMHG
BH CV ECHO MEAS - LV SYSTOLIC VOL/BSA (12-30): 16 CM2
BH CV ECHO MEAS - LV V1 MAX: 65.1 CM/SEC
BH CV ECHO MEAS - LV V1 VTI: 14.9 CM
BH CV ECHO MEAS - LVIDD: 5.4 CM
BH CV ECHO MEAS - LVIDS: 3.3 CM
BH CV ECHO MEAS - LVOT AREA: 3.1 CM2
BH CV ECHO MEAS - LVOT DIAM: 2 CM
BH CV ECHO MEAS - LVPWD: 0.7 CM
BH CV ECHO MEAS - MED PEAK E' VEL: 9.3 CM/SEC
BH CV ECHO MEAS - MV A MAX VEL: 45 CM/SEC
BH CV ECHO MEAS - MV DEC SLOPE: 247 CM/SEC2
BH CV ECHO MEAS - MV E MAX VEL: 57 CM/SEC
BH CV ECHO MEAS - MV E/A: 1.27
BH CV ECHO MEAS - MV P1/2T: 67.6 MSEC
BH CV ECHO MEAS - MVA(P1/2T): 3.3 CM2
BH CV ECHO MEAS - PA ACC TIME: 0.06 SEC
BH CV ECHO MEAS - PA V2 MAX: 71.9 CM/SEC
BH CV ECHO MEAS - SV(LVOT): 46.8 ML
BH CV ECHO MEAS - SV(MOD-SP2): 46.1 ML
BH CV ECHO MEAS - SV(MOD-SP4): 29.4 ML
BH CV ECHO MEAS - SVI(LVOT): 24.5 ML/M2
BH CV ECHO MEAS - SVI(MOD-SP2): 24.1 ML/M2
BH CV ECHO MEAS - SVI(MOD-SP4): 15.4 ML/M2
BH CV ECHO MEAS - TAPSE (>1.6): 1.47 CM
BH CV ECHO MEASUREMENTS AVERAGE E/E' RATIO: 6.26
BH CV XLRA - RV BASE: 3.6 CM
BH CV XLRA - RV LENGTH: 8.3 CM
BH CV XLRA - RV MID: 2.6 CM
LEFT ATRIUM VOLUME INDEX: 8.1 ML/M2
LV EF BIPLANE MOD: 52.8 %

## 2025-08-01 PROCEDURE — 25510000001 IOPAMIDOL PER 1 ML: Performed by: SPECIALIST

## 2025-08-01 PROCEDURE — 93306 TTE W/DOPPLER COMPLETE: CPT

## 2025-08-01 PROCEDURE — 75574 CT ANGIO HRT W/3D IMAGE: CPT

## 2025-08-01 RX ORDER — NITROGLYCERIN 0.4 MG/1
0.4 TABLET SUBLINGUAL
Status: DISCONTINUED | OUTPATIENT
Start: 2025-08-01 | End: 2025-08-02 | Stop reason: HOSPADM

## 2025-08-01 RX ORDER — IOPAMIDOL 755 MG/ML
100 INJECTION, SOLUTION INTRAVASCULAR
Status: COMPLETED | OUTPATIENT
Start: 2025-08-01 | End: 2025-08-01

## 2025-08-01 RX ADMIN — IOPAMIDOL 92 ML: 755 INJECTION, SOLUTION INTRAVENOUS at 13:46

## 2025-08-04 ENCOUNTER — RESULTS FOLLOW-UP (OUTPATIENT)
Dept: CARDIOLOGY | Facility: CLINIC | Age: 55
End: 2025-08-04
Payer: COMMERCIAL

## 2025-08-07 ENCOUNTER — OFFICE VISIT (OUTPATIENT)
Dept: CARDIOLOGY | Facility: CLINIC | Age: 55
End: 2025-08-07
Payer: COMMERCIAL

## 2025-08-07 VITALS
BODY MASS INDEX: 23.99 KG/M2 | WEIGHT: 167.6 LBS | DIASTOLIC BLOOD PRESSURE: 80 MMHG | HEIGHT: 70 IN | OXYGEN SATURATION: 98 % | HEART RATE: 72 BPM | SYSTOLIC BLOOD PRESSURE: 131 MMHG

## 2025-08-07 DIAGNOSIS — G47.33 OSA (OBSTRUCTIVE SLEEP APNEA): ICD-10-CM

## 2025-08-07 DIAGNOSIS — I25.10 CORONARY ARTERY CALCIFICATION: Primary | ICD-10-CM

## 2025-08-07 DIAGNOSIS — I10 ESSENTIAL HYPERTENSION: ICD-10-CM

## 2025-08-07 DIAGNOSIS — E78.5 DYSLIPIDEMIA: ICD-10-CM

## 2025-08-07 PROCEDURE — 99214 OFFICE O/P EST MOD 30 MIN: CPT | Performed by: NURSE PRACTITIONER

## 2025-08-18 ENCOUNTER — HOSPITAL ENCOUNTER (OUTPATIENT)
Facility: HOSPITAL | Age: 55
Discharge: HOME | End: 2025-08-18
Admitting: INTERNAL MEDICINE
Payer: COMMERCIAL

## 2025-08-18 DIAGNOSIS — R91.8 MULTIPLE PULMONARY NODULES: ICD-10-CM

## 2025-08-18 PROCEDURE — 71250 CT THORAX DX C-: CPT

## 2025-08-18 PROCEDURE — 71250 CT THORAX DX C-: CPT | Performed by: RADIOLOGY

## 2025-08-25 ENCOUNTER — DOCUMENTATION (OUTPATIENT)
Dept: PULMONOLOGY | Facility: CLINIC | Age: 55
End: 2025-08-25
Payer: COMMERCIAL

## (undated) DEVICE — ELECTRD BLD EDGE/INSUL1P SFTY SLV 2.75IN

## (undated) DEVICE — SYS CLOSE PORTII CARTR/THOMASN XL

## (undated) DEVICE — ANESTHESIA CIRCUIT ADULT-LF: Brand: MEDLINE INDUSTRIES, INC.

## (undated) DEVICE — SUT MNCRYL 4/0 PS2 18 IN

## (undated) DEVICE — PK LAP GEN 70

## (undated) DEVICE — PENCL E/S HNDSWCH PUSHBTN HOLSTR 10FT

## (undated) DEVICE — TOTAL TRAY, 16FR 10ML SIL FOLEY, URN: Brand: MEDLINE

## (undated) DEVICE — ENDOPOUCH RETRIEVER SPECIMEN RETRIEVAL BAGS: Brand: ENDOPOUCH RETRIEVER

## (undated) DEVICE — 2, DISPOSABLE SUCTION/IRRIGATOR WITH DISPOSABLE TIP: Brand: STRYKEFLOW

## (undated) DEVICE — SUT VIC 0/0 UR6 27IN DYED J603H

## (undated) DEVICE — CAUTERY TIP POLISHER: Brand: DEVON

## (undated) DEVICE — ECHELON FLEX45 ENDOPATH STAPLER, ARTICULATING ENDOSCOPIC LINEAR CUTTER (NO CARTRIDGE): Brand: ECHELON ENDOPATH

## (undated) DEVICE — ENDOPATH XCEL UNIVERSAL TROCAR STABLILITY SLEEVES: Brand: ENDOPATH XCEL

## (undated) DEVICE — ENDOPATH XCEL BLADELESS TROCARS WITH STABILITY SLEEVES: Brand: ENDOPATH XCEL

## (undated) DEVICE — SKIN AFFIX SURG ADHESIVE 72/CS 0.55ML: Brand: MEDLINE

## (undated) DEVICE — HOLDER: Brand: DEROYAL

## (undated) DEVICE — 3M™ STERI-STRIP™ REINFORCED ADHESIVE SKIN CLOSURES, R1547, 1/2 IN X 4 IN (12 MM X 100 MM), 6 STRIPS/ENVELOPE: Brand: 3M™ STERI-STRIP™

## (undated) DEVICE — [HIGH FLOW INSUFFLATOR,  DO NOT USE IF PACKAGE IS DAMAGED,  KEEP DRY,  KEEP AWAY FROM SUNLIGHT,  PROTECT FROM HEAT AND RADIOACTIVE SOURCES.]: Brand: PNEUMOSURE

## (undated) DEVICE — TROCAR: Brand: KII FIOS FIRST ENTRY

## (undated) DEVICE — GLV SURG PREMIERPRO MIC LTX PF SZ7.5 BRN